# Patient Record
Sex: MALE | Race: WHITE | NOT HISPANIC OR LATINO | Employment: FULL TIME | URBAN - METROPOLITAN AREA
[De-identification: names, ages, dates, MRNs, and addresses within clinical notes are randomized per-mention and may not be internally consistent; named-entity substitution may affect disease eponyms.]

---

## 2023-03-03 PROBLEM — R09.81 SINUS CONGESTION: Status: ACTIVE | Noted: 2023-03-03

## 2023-03-03 PROBLEM — R05.9 COUGH: Status: ACTIVE | Noted: 2023-03-03

## 2023-03-03 PROBLEM — N46.9 MALE INFERTILITY: Status: ACTIVE | Noted: 2023-03-03

## 2023-03-03 PROBLEM — K11.8 PAROTID GLAND PAIN: Status: ACTIVE | Noted: 2023-03-03

## 2023-03-03 PROBLEM — B96.89 BACTERIAL URI: Status: ACTIVE | Noted: 2023-03-03

## 2023-03-03 PROBLEM — G93.40 ENCEPHALOPATHY: Status: ACTIVE | Noted: 2023-03-03

## 2023-03-03 PROBLEM — J30.9 ALLERGIC RHINITIS: Status: ACTIVE | Noted: 2023-03-03

## 2023-03-03 PROBLEM — R06.02 SHORTNESS OF BREATH: Status: ACTIVE | Noted: 2023-03-03

## 2023-03-03 PROBLEM — H04.123 DRY EYES, BILATERAL: Status: ACTIVE | Noted: 2023-03-03

## 2023-03-03 PROBLEM — M35.00 SJOGREN'S SYNDROME (MULTI): Status: ACTIVE | Noted: 2023-03-03

## 2023-03-03 PROBLEM — N52.9 ED (ERECTILE DYSFUNCTION): Status: ACTIVE | Noted: 2023-03-03

## 2023-03-03 PROBLEM — H57.89 EYE REDNESS: Status: ACTIVE | Noted: 2023-03-03

## 2023-03-03 PROBLEM — E28.39 ESTROGEN DEFICIENCY: Status: ACTIVE | Noted: 2023-03-03

## 2023-03-03 PROBLEM — H47.239 LARGE PHYSIOLOGIC CUPPING OF OPTIC DISC: Status: ACTIVE | Noted: 2023-03-03

## 2023-03-03 PROBLEM — E34.321: Status: ACTIVE | Noted: 2023-03-03

## 2023-03-03 PROBLEM — R53.83 FATIGUE: Status: ACTIVE | Noted: 2023-03-03

## 2023-03-03 PROBLEM — E66.3 OVERWEIGHT (BMI 25.0-29.9): Status: ACTIVE | Noted: 2023-03-03

## 2023-03-03 PROBLEM — F90.9 ADHD: Status: ACTIVE | Noted: 2023-03-03

## 2023-03-03 PROBLEM — R79.89 LOW TESTOSTERONE: Status: ACTIVE | Noted: 2023-03-03

## 2023-03-03 PROBLEM — R41.3 MEMORY LOSS: Status: ACTIVE | Noted: 2023-03-03

## 2023-03-03 PROBLEM — R00.1 BRADYCARDIA: Status: ACTIVE | Noted: 2023-03-03

## 2023-03-03 PROBLEM — R35.89 POLYURIA: Status: ACTIVE | Noted: 2023-03-03

## 2023-03-03 PROBLEM — M62.81 MUSCLE WEAKNESS: Status: ACTIVE | Noted: 2023-03-03

## 2023-03-03 PROBLEM — H11.003 PTERYGIUM OF BOTH EYES: Status: ACTIVE | Noted: 2023-03-03

## 2023-03-03 PROBLEM — H10.13 ALLERGIC CONJUNCTIVITIS OF BOTH EYES: Status: ACTIVE | Noted: 2023-03-03

## 2023-03-03 PROBLEM — J06.9 BACTERIAL URI: Status: ACTIVE | Noted: 2023-03-03

## 2023-03-03 PROBLEM — R60.0 SWELLING OF LEFT PAROTID GLAND: Status: ACTIVE | Noted: 2023-03-03

## 2023-03-03 PROBLEM — R35.0 INCREASED URINARY FREQUENCY: Status: ACTIVE | Noted: 2023-03-03

## 2023-03-03 PROBLEM — R76.8 ANA POSITIVE: Status: ACTIVE | Noted: 2023-03-03

## 2023-03-03 RX ORDER — CHORIONIC GONADOTROPIN 10000 UNIT
1500 KIT INTRAMUSCULAR
COMMUNITY
Start: 2022-04-08 | End: 2023-03-07

## 2023-03-03 RX ORDER — DEXTROAMPHETAMINE SACCHARATE, AMPHETAMINE ASPARTATE MONOHYDRATE, DEXTROAMPHETAMINE SULFATE AND AMPHETAMINE SULFATE 5; 5; 5; 5 MG/1; MG/1; MG/1; MG/1
1 CAPSULE, EXTENDED RELEASE ORAL DAILY
COMMUNITY
Start: 2022-07-02

## 2023-03-03 RX ORDER — SULFASALAZINE 500 MG/1
1 TABLET ORAL 2 TIMES DAILY
COMMUNITY
Start: 2022-05-17 | End: 2024-01-29 | Stop reason: SDUPTHER

## 2023-03-03 RX ORDER — CHORIONIC GONADOTROPIN, HUMAN 6000 UNIT
VIAL (EA) INJECTION
COMMUNITY
End: 2023-03-07

## 2023-03-03 RX ORDER — AZELASTINE 1 MG/ML
1 SPRAY, METERED NASAL 2 TIMES DAILY
COMMUNITY
Start: 2021-06-07 | End: 2024-04-30 | Stop reason: ALTCHOICE

## 2023-03-03 RX ORDER — TESTOSTERONE CYPIONATE 200 MG/ML
0.5 INJECTION, SOLUTION INTRAMUSCULAR
COMMUNITY
Start: 2022-08-15 | End: 2023-10-25 | Stop reason: SDUPTHER

## 2023-03-03 RX ORDER — FLUTICASONE PROPIONATE 50 MCG
1 SPRAY, SUSPENSION (ML) NASAL 2 TIMES DAILY
COMMUNITY
Start: 2022-06-15 | End: 2024-04-30 | Stop reason: ALTCHOICE

## 2023-03-07 ENCOUNTER — OFFICE VISIT (OUTPATIENT)
Dept: PRIMARY CARE | Facility: CLINIC | Age: 38
End: 2023-03-07
Payer: COMMERCIAL

## 2023-03-07 VITALS
DIASTOLIC BLOOD PRESSURE: 76 MMHG | HEIGHT: 72 IN | SYSTOLIC BLOOD PRESSURE: 118 MMHG | BODY MASS INDEX: 24.52 KG/M2 | WEIGHT: 181 LBS | HEART RATE: 65 BPM

## 2023-03-07 DIAGNOSIS — R20.0 BILATERAL HAND NUMBNESS: ICD-10-CM

## 2023-03-07 DIAGNOSIS — D35.1 PARATHYROID ADENOMA: Primary | ICD-10-CM

## 2023-03-07 PROCEDURE — 99214 OFFICE O/P EST MOD 30 MIN: CPT | Performed by: STUDENT IN AN ORGANIZED HEALTH CARE EDUCATION/TRAINING PROGRAM

## 2023-03-07 NOTE — PROGRESS NOTES
Subjective   Patient ID: Andrew Grant is a 37 y.o. male who presents for Follow-up (Pt presents today to review imaging ).  Today he is accompanied by alone.     HPI  Andrew is presenting after experiencing for a few years of brain fog, fatigue and muscle weakness.   He ordered an MRI of brain and neck as a concern of these chronic symptoms.     Current Outpatient Medications on File Prior to Visit   Medication Sig Dispense Refill    amphetamine-dextroamphetamine XR (Adderall XR) 20 mg 24 hr capsule Take 1 capsule (20 mg) by mouth once daily. For ADHD      azelastine (Astelin) 137 mcg (0.1 %) nasal spray Administer 1 spray into each nostril in the morning and 1 spray before bedtime.      fluticasone (Flonase) 50 mcg/actuation nasal spray Administer 1 spray into each nostril in the morning and 1 spray before bedtime.      sulfaSALAzine (Azulfidine) 500 mg tablet Take 1 tablet (500 mg) by mouth in the morning and 1 tablet (500 mg) before bedtime.      testosterone cypionate (Depo-Testosterone) 200 mg/mL injection Inject 0.5 mL (100 mg) into the shoulder, thigh, or buttocks 1 (one) time per week.      [DISCONTINUED] chorionic gonadotropin (Pregnyl) 10,000 unit injection Inject 1,500 Units into the shoulder, thigh, or buttocks 1 (one) time per week.      [DISCONTINUED] chorionic gonadotropin, human 6,000 unit recon soln HCG 6000 UNIT SOLR   Refills: 0       Active       No current facility-administered medications on file prior to visit.          Review of Systems  All pertinent positive symptoms are included in the history of present illness.  All other systems have been reviewed and are negative and noncontributory to this patient's current ailments.     Objective   /76 (BP Location: Left arm, Patient Position: Sitting, BP Cuff Size: Adult)   Pulse 65   Ht 1.829 m (6')   Wt 82.1 kg (181 lb)   BMI 24.55 kg/m²   BSA: 2.04 meters squared  No visits with results within 1 Month(s) from this visit.   Latest known  visit with results is:   [facility] Encounter on 11/23/2022   Component Date Value Ref Range Status    PSA 11/23/2022 0.66  0.00 - 4.00 ng/mL Final    Comment: The FDA requires that the method used for PSA assay be   reported to the physician. Values obtained with different   assay methods must not be used interchangeably. This test  was performed at [facility] using the Access   Hybritech PSA assay is a two-site immunoenzymatic sandwich   assay. The assay is approved for measurement of   prostate-specific antigen (PSA)in serum and may be used   in conjunction with a digital rectal examination in men   50 years and older as an aid in detection of prostate   cancer.  5-Alpha-reductase inhibitors (e.g. Proscar, Finasteride,   Avodart, Dutasteride and Luz Elena) for the treatment of BPH   have been shown to lower PSA levels by an average of 50%   after 6 months of treatment.      LUTROPIN (IU/ML) IN SER/PLAS 11/23/2022 <0.2  IU/L Final    Comment: REF VALUES  FOLLICULAR        PHASE     1.5-10.0  MID-CYCLE      13.0-72.0  LUTEAL PHASE    0.5-13.0  MENOPAUSE      15.0-65.0  PREPUBERTY        0- 3.0  CHILDREN          0- 6.0  ADULT MALE      1.0- 9.0      Testosterone, Total, LC-MS/MS 11/23/2022 737  250 - 1,100 ng/dL Final    Comment: For additional information, please refer to  http://education.[employer]/faq/  [email or web address]  (This link is being provided for informational/  educational purposes only.)     This test was developed and its analytical performance  characteristics have been determined by [facility] [place], [place]. It has  not been cleared or approved by the U.S. Food and Drug  Administration. This assay has been validated pursuant  to the CLIA regulations and is used for clinical  purposes.      Testosterone, Free 11/23/2022 180.6 (H)  35.0 - 155.0 pg/mL Final    Comment: This test was developed and its analytical performance  characteristics have  been determined by [employer] [place], [place]. It has  not been cleared or approved by the U.S. Food and Drug  Administration. This assay has been validated pursuant  to the CLIA regulations and is used for clinical  purposes.      Estradiol 11/23/2022 26  pg/mL Final    Comment: REF VALUES  FOLLICULAR PHASE      MID CYCLE             LUTEAL PHASE          POSTMENOPAUSE         < 32  PREPUBERTY            < 20  FEMALE 10-18Y        8-110  MALE   10-18Y         < 20  ADULT MALE            < 40      Hematocrit 11/23/2022 48.8  41.0 - 52.0 % Final       Physical Exam  Constitutional:       Appearance: Normal appearance. He is normal weight.   HENT:      Head: Normocephalic and atraumatic.      Right Ear: External ear normal.      Left Ear: External ear normal.      Nose: Congestion present.   Eyes:      Extraocular Movements: Extraocular movements intact.      Conjunctiva/sclera: Conjunctivae normal.   Cardiovascular:      Rate and Rhythm: Normal rate and regular rhythm.      Pulses: Normal pulses.      Heart sounds: Normal heart sounds.   Pulmonary:      Effort: Pulmonary effort is normal.      Breath sounds: Normal breath sounds.   Abdominal:      General: Abdomen is flat. Bowel sounds are normal.      Palpations: Abdomen is soft.   Musculoskeletal:         General: Normal range of motion.      Cervical back: Normal range of motion and neck supple.   Skin:     General: Skin is warm and dry.      Capillary Refill: Capillary refill takes less than 2 seconds.   Neurological:      General: No focal deficit present.      Mental Status: He is alert and oriented to person, place, and time.   Psychiatric:         Mood and Affect: Mood normal.         Behavior: Behavior normal.         Thought Content: Thought content normal.         Judgment: Judgment normal.       Assessment/Plan     Parathyoid adenoma:   Referral to Endocrinology given   NM parathyroid scan ordered per MRI  recommendations    2. Bilateral Hand numbness:   EMG order given      I have personally reviewed all available pertinent labs, imaging, and consult notes with the patient.     All questions and concerns were addressed. Patient verbalizes understanding instructions and agrees with established plan of care.     Patient seen and discussed with Dr. Meka Maldonado MD

## 2023-03-12 ENCOUNTER — TELEPHONE (OUTPATIENT)
Dept: PRIMARY CARE | Facility: CLINIC | Age: 38
End: 2023-03-12
Payer: COMMERCIAL

## 2023-03-12 NOTE — TELEPHONE ENCOUNTER
"Please call the patient about these results    This was in the old EMR and I am not sure if it is coming into this new EMR in the near future      EMG showed no abnormalities and stated that this was a normal evaluation    If anything, this could miss \"small fiber neuropathy \"    Neck step unfortunately would be to go follow-up with neurology to see if they would be able to provide any further information or guidance    Also, please follow-up with the other imaging that was recommended at his appointment  "

## 2023-03-15 DIAGNOSIS — R20.0 BILATERAL HAND NUMBNESS: ICD-10-CM

## 2023-03-17 DIAGNOSIS — R20.0 NUMBNESS AND TINGLING IN BOTH HANDS: ICD-10-CM

## 2023-03-17 DIAGNOSIS — D35.1 PARATHYROID ADENOMA: ICD-10-CM

## 2023-03-17 DIAGNOSIS — R20.2 NUMBNESS AND TINGLING IN BOTH HANDS: ICD-10-CM

## 2023-03-17 DIAGNOSIS — G62.9 PERIPHERAL POLYNEUROPATHY: ICD-10-CM

## 2023-03-17 NOTE — RESULT ENCOUNTER NOTE
This was already resulted through the old electronic medical record    It was a normal study to my knowledge, if this continues to be an issue, the next step would be looking towards neurology    The only thing that we could miss would be small fiber neuropathy but I am not fully sure

## 2023-03-18 LAB
ALANINE AMINOTRANSFERASE (SGPT) (U/L) IN SER/PLAS: 17 U/L (ref 10–52)
ALBUMIN (G/DL) IN SER/PLAS: 4.5 G/DL (ref 3.4–5)
ALKALINE PHOSPHATASE (U/L) IN SER/PLAS: 60 U/L (ref 33–120)
ANION GAP IN SER/PLAS: 12 MMOL/L (ref 10–20)
ASPARTATE AMINOTRANSFERASE (SGOT) (U/L) IN SER/PLAS: 15 U/L (ref 9–39)
BILIRUBIN TOTAL (MG/DL) IN SER/PLAS: 0.8 MG/DL (ref 0–1.2)
C REACTIVE PROTEIN (MG/L) IN SER/PLAS: 0.24 MG/DL
CALCIUM (MG/DL) IN SER/PLAS: 9.6 MG/DL (ref 8.6–10.6)
CARBON DIOXIDE, TOTAL (MMOL/L) IN SER/PLAS: 32 MMOL/L (ref 21–32)
CHLORIDE (MMOL/L) IN SER/PLAS: 101 MMOL/L (ref 98–107)
CREATININE (MG/DL) IN SER/PLAS: 0.81 MG/DL (ref 0.5–1.3)
ERYTHROCYTE DISTRIBUTION WIDTH (RATIO) BY AUTOMATED COUNT: 12.5 % (ref 11.5–14.5)
ERYTHROCYTE MEAN CORPUSCULAR HEMOGLOBIN CONCENTRATION (G/DL) BY AUTOMATED: 33.2 G/DL (ref 32–36)
ERYTHROCYTE MEAN CORPUSCULAR VOLUME (FL) BY AUTOMATED COUNT: 89 FL (ref 80–100)
ERYTHROCYTES (10*6/UL) IN BLOOD BY AUTOMATED COUNT: 5.41 X10E12/L (ref 4.5–5.9)
ESTRADIOL (PG/ML) IN SER/PLAS: 21 PG/ML
GFR MALE: >90 ML/MIN/1.73M2
GLUCOSE (MG/DL) IN SER/PLAS: 85 MG/DL (ref 74–99)
HEMATOCRIT (%) IN BLOOD BY AUTOMATED COUNT: 47.1 % (ref 41–52)
HEMATOCRIT (%) IN BLOOD BY AUTOMATED COUNT: 47.9 % (ref 41–52)
HEMOGLOBIN (G/DL) IN BLOOD: 15.9 G/DL (ref 13.5–17.5)
LEUKOCYTES (10*3/UL) IN BLOOD BY AUTOMATED COUNT: 6.2 X10E9/L (ref 4.4–11.3)
LUTEINIZING HORMONE (IU/ML) IN SER/PLAS: <0.1 IU/L
NRBC (PER 100 WBCS) BY AUTOMATED COUNT: 0 /100 WBC (ref 0–0)
PLATELETS (10*3/UL) IN BLOOD AUTOMATED COUNT: 192 X10E9/L (ref 150–450)
POTASSIUM (MMOL/L) IN SER/PLAS: 4 MMOL/L (ref 3.5–5.3)
PROTEIN TOTAL: 7.3 G/DL (ref 6.4–8.2)
SEDIMENTATION RATE, ERYTHROCYTE: 2 MM/H (ref 0–15)
SODIUM (MMOL/L) IN SER/PLAS: 141 MMOL/L (ref 136–145)
UREA NITROGEN (MG/DL) IN SER/PLAS: 13 MG/DL (ref 6–23)

## 2023-03-22 NOTE — RESULT ENCOUNTER NOTE
Imaging of parathyroid came back as within normal limits    There is no parathyroid adenomas    To be fully honest, I am not due for sure about his signs/symptoms other than him following up with neurology to discuss this further

## 2023-03-23 ENCOUNTER — TELEPHONE (OUTPATIENT)
Dept: PRIMARY CARE | Facility: CLINIC | Age: 38
End: 2023-03-23
Payer: COMMERCIAL

## 2023-03-23 NOTE — TELEPHONE ENCOUNTER
----- Message from Robert Ackerman DO sent at 3/22/2023  4:35 PM EDT -----  Imaging of parathyroid came back as within normal limits    There is no parathyroid adenomas    To be fully honest, I am not due for sure about his signs/symptoms other than him following up with neurology to discuss this further

## 2023-03-23 NOTE — TELEPHONE ENCOUNTER
Pt wants to know if there is any possible vitamin deficiency that could be associated with his symptoms? Otherwise he states he will follow up with neurology. I will place the referral.

## 2023-03-24 LAB
TESTOSTERONE FREE (CHAN): 114.2 PG/ML (ref 35–155)
TESTOSTERONE,TOTAL,LC-MS/MS: 680 NG/DL (ref 250–1100)

## 2023-08-24 PROBLEM — R41.89 BRAIN FOG: Status: ACTIVE | Noted: 2023-08-24

## 2023-08-24 PROBLEM — G62.9 SMALL FIBER NEUROPATHY: Status: ACTIVE | Noted: 2023-08-24

## 2023-08-24 RX ORDER — CHORIONIC GONADOTROPIN 10000 UNIT
KIT INTRAMUSCULAR
COMMUNITY
Start: 2022-04-08 | End: 2023-10-25 | Stop reason: ALTCHOICE

## 2023-08-24 RX ORDER — CHORIONIC GONADOTROPIN, HUMAN 6000 UNIT
VIAL (EA) INJECTION
COMMUNITY
End: 2023-10-25 | Stop reason: ALTCHOICE

## 2023-08-24 RX ORDER — SULFAMETHOXAZOLE AND TRIMETHOPRIM 800; 160 MG/1; MG/1
1 TABLET ORAL 2 TIMES DAILY
COMMUNITY
Start: 2022-12-06 | End: 2023-10-25 | Stop reason: ALTCHOICE

## 2023-08-24 RX ORDER — ACETAMINOPHEN 500 MG
1 TABLET ORAL
COMMUNITY

## 2023-08-24 RX ORDER — GLUCOSAM/CHONDRO/HERB 149/HYAL 750-100 MG
TABLET ORAL
COMMUNITY

## 2023-08-30 ENCOUNTER — OFFICE VISIT (OUTPATIENT)
Dept: PRIMARY CARE | Facility: CLINIC | Age: 38
End: 2023-08-30
Payer: COMMERCIAL

## 2023-08-30 VITALS
BODY MASS INDEX: 24.95 KG/M2 | HEART RATE: 81 BPM | HEIGHT: 72 IN | WEIGHT: 184.2 LBS | DIASTOLIC BLOOD PRESSURE: 72 MMHG | SYSTOLIC BLOOD PRESSURE: 126 MMHG

## 2023-08-30 DIAGNOSIS — Z00.00 HEALTHCARE MAINTENANCE: Primary | ICD-10-CM

## 2023-08-30 DIAGNOSIS — R79.89 LOW TESTOSTERONE: ICD-10-CM

## 2023-08-30 DIAGNOSIS — M35.00 SJOGREN'S SYNDROME, WITH UNSPECIFIED ORGAN INVOLVEMENT (MULTI): ICD-10-CM

## 2023-08-30 DIAGNOSIS — F90.9 ATTENTION DEFICIT HYPERACTIVITY DISORDER (ADHD), UNSPECIFIED ADHD TYPE: ICD-10-CM

## 2023-08-30 DIAGNOSIS — E53.8 B12 DEFICIENCY: ICD-10-CM

## 2023-08-30 DIAGNOSIS — R41.89 BRAIN FOG: ICD-10-CM

## 2023-08-30 DIAGNOSIS — E50.9 VITAMIN A DEFICIENCY: ICD-10-CM

## 2023-08-30 LAB
POC APPEARANCE, URINE: CLEAR
POC BILIRUBIN, URINE: NEGATIVE
POC BLOOD, URINE: NEGATIVE
POC COLOR, URINE: YELLOW
POC GLUCOSE, URINE: NEGATIVE MG/DL
POC KETONES, URINE: NEGATIVE MG/DL
POC LEUKOCYTES, URINE: NEGATIVE
POC NITRITE,URINE: NEGATIVE
POC PH, URINE: 6 PH
POC PROTEIN, URINE: NEGATIVE MG/DL
POC SPECIFIC GRAVITY, URINE: 1.02
POC UROBILINOGEN, URINE: 0.2 EU/DL

## 2023-08-30 PROCEDURE — 81002 URINALYSIS NONAUTO W/O SCOPE: CPT | Performed by: STUDENT IN AN ORGANIZED HEALTH CARE EDUCATION/TRAINING PROGRAM

## 2023-08-30 PROCEDURE — 93000 ELECTROCARDIOGRAM COMPLETE: CPT | Performed by: STUDENT IN AN ORGANIZED HEALTH CARE EDUCATION/TRAINING PROGRAM

## 2023-08-30 PROCEDURE — 99395 PREV VISIT EST AGE 18-39: CPT | Performed by: STUDENT IN AN ORGANIZED HEALTH CARE EDUCATION/TRAINING PROGRAM

## 2023-08-30 NOTE — PROGRESS NOTES
Subjective   Patient ID: Andrew Grant is a 38 y.o. male who presents for Annual Exam.  Today he is accompanied by alone.     HPI  Health maintenance  Overall patient is doing well.   Immunization: Tdap 1/2018  Influenza vaccine: Declines   COVID-19 vaccine (Pfizer Moderna) #1: 9/2021  #2: 9/2021  Colon Cancer Screening: No family history  Diet: eats healthy balanced diet  Exercise: minimal dedicated time, spends time playing with children  Tobacco: Denies use. Never smoker  EtOH: 5 units per week      2. Sjogren's Syndrome  Has been following rhuematology, Dr. Connolly  Following up in 1-2 months.  Is taking sulfasalazine 500 mg twice a day, OTC Biotene, and eyedrops.  Joint pain is decreased, but dry eye and mouth is not improving much.    3. ADHD  Has been taking Adderall XR 20 mg occasionally. Finds it helpful when needed.     4. Allergic rhinitis  Symptoms flare in the spring time.  Has been taking azelastine 137 mcg and Flonase 50 mcg nasal spray, once at morning and night.    5. Low testosterone  Receives 100 mg testosterone injection one time per week.  Finds it very helpful.    6. Erectile dysfunction  Following urology, Dr. Felix    7. Vitamin Deficiency  Patient states an online vitamin panel returned a low B12 and Vitamin A  He also states 'brain fog', muscle weakness, and tingling in the hands and feet for the past 3-4 years.     Current Outpatient Medications on File Prior to Visit   Medication Sig Dispense Refill    amphetamine-dextroamphetamine XR (Adderall XR) 20 mg 24 hr capsule Take 1 capsule (20 mg) by mouth once daily. For ADHD      azelastine (Astelin) 137 mcg (0.1 %) nasal spray Administer 1 spray into each nostril in the morning and 1 spray before bedtime.      cholecalciferol (Vitamin D-3) 5,000 Units tablet Take 1 tablet (5,000 Units) by mouth 1 (one) time per week.      chorionic gonadotropin (Pregnyl) 10,000 unit injection Inject into the shoulder, thigh, or buttocks.      chorionic  gonadotropin, human 6,000 unit recon soln HCG 6000 UNIT SOLR  Refills: 0      fluticasone (Flonase) 50 mcg/actuation nasal spray Administer 1 spray into each nostril in the morning and 1 spray before bedtime.      omega 3-dha-epa-fish oil (Fish OiL) 1,000 mg (120 mg-180 mg) capsule Take by mouth.      sulfamethoxazole-trimethoprim (Bactrim DS) 800-160 mg tablet Take 1 tablet by mouth 2 times a day.      sulfaSALAzine (Azulfidine) 500 mg tablet Take 1 tablet (500 mg) by mouth in the morning and 1 tablet (500 mg) before bedtime.      testosterone cypionate (Depo-Testosterone) 200 mg/mL injection Inject 0.5 mL (100 mg) into the shoulder, thigh, or buttocks 1 (one) time per week.       No current facility-administered medications on file prior to visit.        Allergies   Allergen Reactions    Amoxicillin-Pot Clavulanate Hives    Potassium Clavulanate Unknown       Immunization History   Administered Date(s) Administered    Hep A / Hep B 01/19/2018    Hep A, Unspecified 03/16/2018    Hep B, Unspecified 03/16/2018    Influenza, seasonal, injectable 11/12/2019    Pfizer Purple Cap SARS-CoV-2 09/08/2021, 09/29/2021    Td vaccine, age 7 years and older (TENIVAC) 01/19/2018         Review of Systems  All pertinent positive symptoms are included in the history of present illness.  All other systems have been reviewed and are negative and noncontributory to this patient's current ailments.     Objective   /72 (BP Location: Left arm, Patient Position: Sitting, BP Cuff Size: Adult)   Pulse 81   Ht 1.829 m (6')   Wt 83.6 kg (184 lb 3.2 oz)   BMI 24.98 kg/m²   BSA: 2.06 meters squared  Office Visit on 08/30/2023   Component Date Value Ref Range Status    POC Color, Urine 08/30/2023 Yellow  Straw, Yellow, Light Yellow Final    POC Appearance, Urine 08/30/2023 Clear  Clear Final    POC Specific Gravity, Urine 08/30/2023 1.020  1.005 - 1.035 Final    POC PH, Urine 08/30/2023 6.0  No Reference Range Established PH Final     POC Protein, Urine 08/30/2023 NEGATIVE  NEGATIVE, 30 (1+) mg/dl Final    POC Glucose, Urine 08/30/2023 NEGATIVE  NEGATIVE mg/dl Final    POC Blood, Urine 08/30/2023 NEGATIVE  NEGATIVE Final    POC Ketones, Urine 08/30/2023 NEGATIVE  NEGATIVE mg/dl Final    POC Bilirubin, Urine 08/30/2023 NEGATIVE  NEGATIVE Final    POC Urobilinogen, Urine 08/30/2023 0.2  0.2, 1.0 EU/DL Final    Poc Nitrate, Urine 08/30/2023 NEGATIVE  NEGATIVE Final    POC Leukocytes, Urine 08/30/2023 NEGATIVE  NEGATIVE Final       Physical Exam  CONSTITUTIONAL - well nourished, well developed, looks like stated age, in no acute distress, not ill-appearing, and not tired appearing  SKIN - normal skin color and pigmentation, normal skin turgor without rash, lesions, or nodules visualized  HEAD - no trauma, normocephalic  EYES - pupils are equal and reactive to light, extraocular muscles are intact, and normal external exam  ENT - TM's intact, no injection, no signs of infection, uvula midline, normal tongue movement and throat normal, no exudate, nasal passage without discharge and patent  NECK - supple without rigidity, no neck mass was observed, no thyromegaly or thyroid nodules  CHEST - clear to auscultation, no wheezing, no crackles and no rales, good effort  CARDIAC - regular rate and regular rhythm, no skipped beats, no murmur  ABDOMEN - no organomegaly, soft, nontender, nondistended, normal bowel sounds, no guarding/rebound/rigidity, negative McBurney sign and negative Luu sign  EXTREMITIES - no edema, no deformities  NEUROLOGICAL - DTRs equal and symmetrical; alert, oriented and no focal signs  PSYCHIATRIC - alert, pleasant and cordial, age-appropriate    Assessment/Plan   Health maintenance  Complete history and physical examination was performed  EKG reveals sinus rhythm with a short SD but otherwise no acute changes  We will notify of test results once available and make treatment recommendations accordingly  Please tell the  well-balanced diet and excise regularly    2. Sjogren's Syndrome  Continue following rhuematology, Dr. Connolly  Continue taking sulfasalazine 500 mg twice a day, OTC Biotene, and eyedrops    3. ADHD  Continue taking Adderall XR 20 mg once daily,  Otherwise continue following up with psychiatry    4. Allergic rhinitis  Continue taking azelastine 137 mcg and Flonase 50 mcg nasal spray, once at morning and night, as needed.    5. Low testosterone  Continue 100 mg testosterone injection one time per week    6. Erectile dysfunction  Continue following urology, Dr. Felix    7. Vitamin Deficiency  Will order Vitamin A and B12 labs alongside vitamin D  I would recommend you continue your supplementation daily and we can repeat this within the next 6 months

## 2023-08-31 ENCOUNTER — LAB (OUTPATIENT)
Dept: LAB | Facility: LAB | Age: 38
End: 2023-08-31
Payer: COMMERCIAL

## 2023-08-31 DIAGNOSIS — E53.8 B12 DEFICIENCY: ICD-10-CM

## 2023-08-31 DIAGNOSIS — E50.9 VITAMIN A DEFICIENCY: ICD-10-CM

## 2023-08-31 DIAGNOSIS — Z00.00 HEALTHCARE MAINTENANCE: ICD-10-CM

## 2023-08-31 DIAGNOSIS — R41.89 BRAIN FOG: ICD-10-CM

## 2023-08-31 LAB
ALANINE AMINOTRANSFERASE (SGPT) (U/L) IN SER/PLAS: 12 U/L (ref 10–52)
ALBUMIN (G/DL) IN SER/PLAS: 4.1 G/DL (ref 3.4–5)
ALKALINE PHOSPHATASE (U/L) IN SER/PLAS: 53 U/L (ref 33–120)
ANION GAP IN SER/PLAS: 9 MMOL/L (ref 10–20)
ASPARTATE AMINOTRANSFERASE (SGOT) (U/L) IN SER/PLAS: 16 U/L (ref 9–39)
BASOPHILS (10*3/UL) IN BLOOD BY AUTOMATED COUNT: 0.04 X10E9/L (ref 0–0.1)
BASOPHILS/100 LEUKOCYTES IN BLOOD BY AUTOMATED COUNT: 0.6 % (ref 0–2)
BILIRUBIN TOTAL (MG/DL) IN SER/PLAS: 0.5 MG/DL (ref 0–1.2)
CALCIDIOL (25 OH VITAMIN D3) (NG/ML) IN SER/PLAS: 56 NG/ML
CALCIUM (MG/DL) IN SER/PLAS: 9.2 MG/DL (ref 8.6–10.3)
CARBON DIOXIDE, TOTAL (MMOL/L) IN SER/PLAS: 29 MMOL/L (ref 21–32)
CHLORIDE (MMOL/L) IN SER/PLAS: 105 MMOL/L (ref 98–107)
CHOLESTEROL (MG/DL) IN SER/PLAS: 149 MG/DL (ref 0–199)
CHOLESTEROL IN HDL (MG/DL) IN SER/PLAS: 27.9 MG/DL
CHOLESTEROL/HDL RATIO: 5.3
COBALAMIN (VITAMIN B12) (PG/ML) IN SER/PLAS: 223 PG/ML (ref 211–911)
CREATININE (MG/DL) IN SER/PLAS: 0.87 MG/DL (ref 0.5–1.3)
EOSINOPHILS (10*3/UL) IN BLOOD BY AUTOMATED COUNT: 0.12 X10E9/L (ref 0–0.7)
EOSINOPHILS/100 LEUKOCYTES IN BLOOD BY AUTOMATED COUNT: 1.8 % (ref 0–6)
ERYTHROCYTE DISTRIBUTION WIDTH (RATIO) BY AUTOMATED COUNT: 13 % (ref 11.5–14.5)
ERYTHROCYTE MEAN CORPUSCULAR HEMOGLOBIN CONCENTRATION (G/DL) BY AUTOMATED: 32.6 G/DL (ref 32–36)
ERYTHROCYTE MEAN CORPUSCULAR VOLUME (FL) BY AUTOMATED COUNT: 90 FL (ref 80–100)
ERYTHROCYTES (10*6/UL) IN BLOOD BY AUTOMATED COUNT: 5.34 X10E12/L (ref 4.5–5.9)
GFR MALE: >90 ML/MIN/1.73M2
GLUCOSE (MG/DL) IN SER/PLAS: 76 MG/DL (ref 74–99)
HEMATOCRIT (%) IN BLOOD BY AUTOMATED COUNT: 47.9 % (ref 41–52)
HEMOGLOBIN (G/DL) IN BLOOD: 15.6 G/DL (ref 13.5–17.5)
IMMATURE GRANULOCYTES/100 LEUKOCYTES IN BLOOD BY AUTOMATED COUNT: 0.3 % (ref 0–0.9)
LDL: 109 MG/DL (ref 0–99)
LEUKOCYTES (10*3/UL) IN BLOOD BY AUTOMATED COUNT: 6.7 X10E9/L (ref 4.4–11.3)
LYMPHOCYTES (10*3/UL) IN BLOOD BY AUTOMATED COUNT: 2.18 X10E9/L (ref 1.2–4.8)
LYMPHOCYTES/100 LEUKOCYTES IN BLOOD BY AUTOMATED COUNT: 32.6 % (ref 13–44)
MONOCYTES (10*3/UL) IN BLOOD BY AUTOMATED COUNT: 0.74 X10E9/L (ref 0.1–1)
MONOCYTES/100 LEUKOCYTES IN BLOOD BY AUTOMATED COUNT: 11.1 % (ref 2–10)
NEUTROPHILS (10*3/UL) IN BLOOD BY AUTOMATED COUNT: 3.59 X10E9/L (ref 1.2–7.7)
NEUTROPHILS/100 LEUKOCYTES IN BLOOD BY AUTOMATED COUNT: 53.6 % (ref 40–80)
PLATELETS (10*3/UL) IN BLOOD AUTOMATED COUNT: 199 X10E9/L (ref 150–450)
POTASSIUM (MMOL/L) IN SER/PLAS: 4.2 MMOL/L (ref 3.5–5.3)
PROTEIN TOTAL: 7.3 G/DL (ref 6.4–8.2)
SODIUM (MMOL/L) IN SER/PLAS: 139 MMOL/L (ref 136–145)
THYROTROPIN (MIU/L) IN SER/PLAS BY DETECTION LIMIT <= 0.05 MIU/L: 1.98 MIU/L (ref 0.44–3.98)
TRIGLYCERIDE (MG/DL) IN SER/PLAS: 61 MG/DL (ref 0–149)
UREA NITROGEN (MG/DL) IN SER/PLAS: 16 MG/DL (ref 6–23)
VLDL: 12 MG/DL (ref 0–40)

## 2023-08-31 PROCEDURE — 80053 COMPREHEN METABOLIC PANEL: CPT

## 2023-08-31 PROCEDURE — 82306 VITAMIN D 25 HYDROXY: CPT

## 2023-08-31 PROCEDURE — 84443 ASSAY THYROID STIM HORMONE: CPT

## 2023-08-31 PROCEDURE — 36415 COLL VENOUS BLD VENIPUNCTURE: CPT

## 2023-08-31 PROCEDURE — 83036 HEMOGLOBIN GLYCOSYLATED A1C: CPT

## 2023-08-31 PROCEDURE — 80061 LIPID PANEL: CPT

## 2023-08-31 PROCEDURE — 82607 VITAMIN B-12: CPT

## 2023-08-31 PROCEDURE — 85025 COMPLETE CBC W/AUTO DIFF WBC: CPT

## 2023-08-31 PROCEDURE — 84590 ASSAY OF VITAMIN A: CPT

## 2023-09-01 ENCOUNTER — TELEPHONE (OUTPATIENT)
Dept: PRIMARY CARE | Facility: CLINIC | Age: 38
End: 2023-09-01
Payer: COMMERCIAL

## 2023-09-01 LAB
ESTIMATED AVERAGE GLUCOSE FOR HBA1C: 105 MG/DL
HEMOGLOBIN A1C/HEMOGLOBIN TOTAL IN BLOOD: 5.3 %

## 2023-09-01 NOTE — TELEPHONE ENCOUNTER
Spoke w/pt, pt aware of results    Cholesterol looks good at 149, HDL 27, LDL slightly elevated at 109 but triglycerides at 61     Sugar, kidneys, liver, electrolytes are all within normal limits     Vitamin B12 within normal limits at but at the lower end of normal     Vitamin D fantastic at 56     Thyroid within normal limits alongside complete blood cell count     We are still waiting for the vitamin A as well as a hemoglobin A1c at this time

## 2023-09-01 NOTE — RESULT ENCOUNTER NOTE
Cholesterol looks good at 149, HDL 27, LDL slightly elevated at 109 but triglycerides at 61    Sugar, kidneys, liver, electrolytes are all within normal limits    Vitamin B12 within normal limits at but at the lower end of normal    Vitamin D fantastic at 56    Thyroid within normal limits alongside complete blood cell count    We are still waiting for the vitamin A as well as a hemoglobin A1c at this time

## 2023-09-04 LAB
VITAMIN A (RETINOL): 0.56 MG/L (ref 0.3–1.2)
VITAMIN A (RETINYL PALMITATE): <0.02 MG/L (ref 0–0.1)
VITAMIN A, INTERPRETATION: NORMAL

## 2023-10-03 ENCOUNTER — APPOINTMENT (OUTPATIENT)
Dept: UROLOGY | Facility: CLINIC | Age: 38
End: 2023-10-03
Payer: COMMERCIAL

## 2023-10-19 ENCOUNTER — LAB (OUTPATIENT)
Dept: LAB | Facility: LAB | Age: 38
End: 2023-10-19
Payer: COMMERCIAL

## 2023-10-19 DIAGNOSIS — M35.00 SJOGREN SYNDROME, UNSPECIFIED (MULTI): ICD-10-CM

## 2023-10-19 DIAGNOSIS — R79.89 OTHER SPECIFIED ABNORMAL FINDINGS OF BLOOD CHEMISTRY: Primary | ICD-10-CM

## 2023-10-19 LAB
CRP SERPL-MCNC: <0.1 MG/DL
ERYTHROCYTE [SEDIMENTATION RATE] IN BLOOD BY WESTERGREN METHOD: 12 MM/H (ref 0–15)
HCT VFR BLD AUTO: 47.9 % (ref 41–52)
PSA SERPL-MCNC: 0.63 NG/ML

## 2023-10-19 PROCEDURE — 86140 C-REACTIVE PROTEIN: CPT

## 2023-10-19 PROCEDURE — 83002 ASSAY OF GONADOTROPIN (LH): CPT

## 2023-10-19 PROCEDURE — 86225 DNA ANTIBODY NATIVE: CPT

## 2023-10-19 PROCEDURE — 82670 ASSAY OF TOTAL ESTRADIOL: CPT

## 2023-10-19 PROCEDURE — 36415 COLL VENOUS BLD VENIPUNCTURE: CPT

## 2023-10-19 PROCEDURE — 85652 RBC SED RATE AUTOMATED: CPT

## 2023-10-19 PROCEDURE — 86160 COMPLEMENT ANTIGEN: CPT

## 2023-10-19 PROCEDURE — 85014 HEMATOCRIT: CPT

## 2023-10-19 PROCEDURE — 84153 ASSAY OF PSA TOTAL: CPT

## 2023-10-19 PROCEDURE — 84403 ASSAY OF TOTAL TESTOSTERONE: CPT

## 2023-10-20 LAB
C3 SERPL-MCNC: 106 MG/DL (ref 87–200)
C4 SERPL-MCNC: 23 MG/DL (ref 10–50)
DSDNA AB SER-ACNC: <1 IU/ML
ESTRADIOL SERPL-MCNC: <19 PG/ML
LH SERPL-ACNC: 0.1 IU/L
TESTOST SERPL-MCNC: 625 NG/DL (ref 240–1000)

## 2023-10-25 ENCOUNTER — OFFICE VISIT (OUTPATIENT)
Dept: UROLOGY | Facility: CLINIC | Age: 38
End: 2023-10-25
Payer: COMMERCIAL

## 2023-10-25 DIAGNOSIS — E29.1 HYPOGONADISM IN MALE: Primary | ICD-10-CM

## 2023-10-25 DIAGNOSIS — E28.39 ESTROGEN DEFICIENCY: ICD-10-CM

## 2023-10-25 DIAGNOSIS — Z12.5 PROSTATE CANCER SCREENING: ICD-10-CM

## 2023-10-25 DIAGNOSIS — N52.9 ERECTILE DYSFUNCTION, UNSPECIFIED ERECTILE DYSFUNCTION TYPE: ICD-10-CM

## 2023-10-25 PROCEDURE — 99214 OFFICE O/P EST MOD 30 MIN: CPT | Performed by: UROLOGY

## 2023-10-25 RX ORDER — TESTOSTERONE CYPIONATE 200 MG/ML
100 INJECTION, SOLUTION INTRAMUSCULAR
Qty: 4 ML | Refills: 5 | Status: SHIPPED | OUTPATIENT
Start: 2023-10-25 | End: 2024-01-02 | Stop reason: SDUPTHER

## 2023-10-25 NOTE — PROGRESS NOTES
"HPI  39 yo male patient here for concern for hypogonadism and vasectomy     Last visit 3/29/23:  -TC .5 cc weekly  -sp vasectomy SA  -low t fu labs     Today's visit 10/25/23  #Vasectomy  -sp vasectomy 11/29/22: surg path vas deferens       #Hypogonadism  -good energy. Libido comes and goes  -denies breast enlargement, sensitivity, or pain  -has issues with hands and feet, not correlated with T therapy, being evaluated  -libido comes and goes  -He reports he is still experiencing brain fog, muscle weakness, and tingling in feet for 3-4 years has been seen by his rheumatologist.        HISTORY:  -[facility] manages testosterone for 1.5 years- cypionate 160 mg weekly   -he has provided blood work from 2019  -Previous gynecomastia: yes 1 year ago   -Personal or family history of prostate cancer: none  -Previous use of testosterone or anabolic steroids: currently on testosterone cypionate   Previous labs prior to starting T reviewed (brought in results from [facility], scanned in): T 400; free T 7.6, LH 4.7  E2 10        #Erectile Dysfunction  -not interested in meds - improved      Lab Results   Component Value Date    TESTOSTERONE 625 10/19/2023     Lab Results   Component Value Date    LH 0.1 10/19/2023     Lab Results   Component Value Date    ESTRADIOL <19 10/19/2023     Lab Results   Component Value Date    PSA 0.63 10/19/2023     Lab Results   Component Value Date    GFRMALE >90 08/31/2023     Lab Results   Component Value Date    CREATININE 0.87 08/31/2023     Lab Results   Component Value Date    TESTF 114.2 03/18/2023     No results found for: \"HEAD4\"  Lab Results   Component Value Date    HCT 47.9 10/19/2023       Current Medications:  Current Outpatient Medications   Medication Sig Dispense Refill    amphetamine-dextroamphetamine XR (Adderall XR) 20 mg 24 hr capsule Take 1 capsule (20 mg) by mouth once daily. For ADHD      azelastine (Astelin) 137 mcg (0.1 %) nasal spray Administer 1 spray into each " nostril 2 times a day.      cholecalciferol (Vitamin D-3) 5,000 Units tablet Take 1 tablet (5,000 Units) by mouth 1 (one) time per week.      fluticasone (Flonase) 50 mcg/actuation nasal spray Administer 1 spray into each nostril 2 times a day.      omega 3-dha-epa-fish oil (Fish OiL) 1,000 mg (120 mg-180 mg) capsule Take by mouth.      sulfaSALAzine (Azulfidine) 500 mg tablet Take 1 tablet (500 mg) by mouth 2 times a day.      testosterone cypionate (Depo-Testosterone) 200 mg/mL injection Inject 0.5 mL (100 mg) into the muscle 1 (one) time per week.      chorionic gonadotropin (Pregnyl) 10,000 unit injection Inject into the shoulder, thigh, or buttocks.      chorionic gonadotropin, human 6,000 unit recon soln HCG 6000 UNIT SOLR  Refills: 0      sulfamethoxazole-trimethoprim (Bactrim DS) 800-160 mg tablet Take 1 tablet by mouth 2 times a day.       No current facility-administered medications for this visit.        PMH:  Past Medical History:   Diagnosis Date    Allergy status to unspecified drugs, medicaments and biological substances     History of seasonal allergies    Personal history of other specified conditions 11/09/2022    History of fatigue       PSH:  Past Surgical History:   Procedure Laterality Date    OTHER SURGICAL HISTORY  07/02/2020    Jaw surgery    OTHER SURGICAL HISTORY  07/02/2020    Hand surgery    OTHER SURGICAL HISTORY  07/02/2020    Hernia repair       FMH:  No family history on file.    SHx:  Social History     Tobacco Use    Smoking status: Never    Smokeless tobacco: Never       Allergies:  Allergies   Allergen Reactions    Amoxicillin-Pot Clavulanate Hives    Potassium Clavulanate Unknown       Physical Exam     CONSTITUTIONAL:        No acute distress      HEAD:        Normocephalic and atraumatic      CHEST / RESPIRATORY      no excess work of breathing, no respiratory distress,      ABDOMEN / GASTROINTESTINAL:        Abdomen nondistended      GENITOURINARY / GENITALIA:    Testicles  descended bilaterally, testicular size 25cc bilaterally, soft, nontender to palpation    Varicocele: none palpable    Hydrocele: none palpable    Vas deferens and epididymis palpable bilaterally    Penis: normal  circumcised phallus without lesions, meatus in normal anatomic location      Assesment/Plan  #Hypogonadism  -continue TC .5cc weekly, refilled     #Screening for polycythemia  -Hct  -prn phlebotomy for Hct>54     #Screening for hyperestrogenemia  -E2   -arimidex if E is elevated     #Prostate cancer screening  -PSA     #sp vasectomy  -post vas SA looks good     fu in 1 year with low T FU labs       Scribe Attestation  By signing my name below, I, Shamika Mercado-Izzy Warren   attest that this documentation has been prepared under the direction and in the presence of Indio Felix MD.

## 2024-01-02 DIAGNOSIS — E29.1 HYPOGONADISM IN MALE: ICD-10-CM

## 2024-01-02 RX ORDER — TESTOSTERONE CYPIONATE 200 MG/ML
100 INJECTION, SOLUTION INTRAMUSCULAR
Qty: 4 ML | Refills: 5 | Status: SHIPPED | OUTPATIENT
Start: 2024-01-02 | End: 2024-02-23 | Stop reason: SDUPTHER

## 2024-01-26 ENCOUNTER — TELEMEDICINE (OUTPATIENT)
Dept: UROLOGY | Facility: CLINIC | Age: 39
End: 2024-01-26
Payer: COMMERCIAL

## 2024-01-26 DIAGNOSIS — E29.1 HYPOGONADISM IN MALE: ICD-10-CM

## 2024-01-26 DIAGNOSIS — Z09 ENCOUNTER FOR FOLLOW-UP: ICD-10-CM

## 2024-01-26 DIAGNOSIS — Z00.00 HEALTH MAINTENANCE EXAMINATION: ICD-10-CM

## 2024-01-26 DIAGNOSIS — N52.9 ERECTILE DYSFUNCTION, UNSPECIFIED ERECTILE DYSFUNCTION TYPE: Primary | ICD-10-CM

## 2024-01-26 PROCEDURE — 99214 OFFICE O/P EST MOD 30 MIN: CPT | Performed by: UROLOGY

## 2024-01-26 RX ORDER — TADALAFIL 20 MG/1
20 TABLET ORAL AS NEEDED
Qty: 30 TABLET | Refills: 6 | Status: SHIPPED | OUTPATIENT
Start: 2024-01-26 | End: 2025-01-25

## 2024-01-26 NOTE — PROGRESS NOTES
Virtual or Telephone Consent    An interactive audio and video telecommunication system which permits real time communications between the patient (at the originating site) and provider (at the distant site) was utilized to provide this telehealth service.   Verbal consent was requested and obtained from Andrew Grant on this date, 01/26/24 for a telehealth visit.     HPI  39 yo male patient here for concern for hypogonadism and vasectomy     Last visit 10/25/23:  -continue TC .5cc weekly, refilled, no changes throughout week  -PSA  -post vas SA looks good         Today's visit:  #Erectile Dysfunction  -New erectile dysfunction     Duration: Last few months  On nitrates/NTG?: No  Smoker? No  Partner: wife  Tried PDE5is?: No  Tried penile injections: no  Libido/Desire: Low    #Vasectomy  -sp vasectomy 11/29/22: surg path vas deferens     #Hypogonadism  -fertility labs not completed  -TC .5cc -->  -Not taking estrogen blockers  -No breast enlargement, tenderness or pain side effects.    -He reports he is still experiencing brain fog, muscle weakness, and tingling in feet for 3-4 years has been seen by his rheumatologist.     HISTORY:  -[employer] manages testosterone for 1.5 years- cypionate 160 mg weekly   -he has provided blood work from 2019  -Previous gynecomastia: yes 1 year ago   -Personal or family history of prostate cancer: none  -Previous use of testosterone or anabolic steroids: currently on testosterone cypionate   Previous labs prior to starting T reviewed (brought in results from [facility], scanned in): T 400; free T 7.6, LH 4.7  E2 10      Current Medications:  Current Outpatient Medications   Medication Sig Dispense Refill    amphetamine-dextroamphetamine XR (Adderall XR) 20 mg 24 hr capsule Take 1 capsule (20 mg) by mouth once daily. For ADHD      azelastine (Astelin) 137 mcg (0.1 %) nasal spray Administer 1 spray into each nostril 2 times a day.      cholecalciferol (Vitamin D-3) 5,000 Units  tablet Take 1 tablet (5,000 Units) by mouth 1 (one) time per week.      fluticasone (Flonase) 50 mcg/actuation nasal spray Administer 1 spray into each nostril 2 times a day.      omega 3-dha-epa-fish oil (Fish OiL) 1,000 mg (120 mg-180 mg) capsule Take by mouth.      sulfaSALAzine (Azulfidine) 500 mg tablet Take 1 tablet (500 mg) by mouth 2 times a day.      testosterone cypionate (Depo-Testosterone) 200 mg/mL injection Inject 0.5 mL (100 mg) into the muscle 1 (one) time per week. 4 mL 5     No current facility-administered medications for this visit.        PMH:  Past Medical History:   Diagnosis Date    Allergy status to unspecified drugs, medicaments and biological substances     History of seasonal allergies    Personal history of other specified conditions 11/09/2022    History of fatigue       PSH:  Past Surgical History:   Procedure Laterality Date    OTHER SURGICAL HISTORY  07/02/2020    Jaw surgery    OTHER SURGICAL HISTORY  07/02/2020    Hand surgery    OTHER SURGICAL HISTORY  07/02/2020    Hernia repair       FMH:  No family history on file.    SHx:  Social History     Tobacco Use    Smoking status: Never    Smokeless tobacco: Never       Allergies:  Allergies   Allergen Reactions    Amoxicillin-Pot Clavulanate Hives    Potassium Clavulanate Unknown       Physical Exam   CONSTITUTIONAL:        No acute distress    HEAD:        Normocephalic and atraumatic    CHEST / RESPIRATORY      no excess work of breathing, no respiratory distress,    ABDOMEN / GASTROINTESTINAL:        Abdomen nondistended        Assesment/Plan  #Hypogonadism  -continue TC .5cc weekly  -low T fu labs now     #Screening for polycythemia  -Hct  -prn phlebotomy for Hct>54     #Screening for hyperestrogenemia  -E2   -arimidex if E is elevated     #Prostate cancer screening  -PSA     #sp vasectomy  -post vas SA looks good    #Erectile Dysfunction: I discussed the etiology and different treatment modalities for erectile dysfunction.  Specifically, we talked about lifestyle factors like smoking, diet, and exercise. We also discussed ED as a sign of systemic disease, and the contributions of elevated cholesterol and elevated blood sugars on erections. We then discussed treatment modalities, specifically PDE5 inhibitors, intracavernosal injections, vacuum erectile devices, and penile prostheses.    ED panel  Trial of Cialis 20mg - medication counseling done. Can start with half tab. Discussed side effects including priapism, headaches, stuffiness, and back pain. Discussed that if he gets an erection >4 hours, he needs to present to the emergency room or risk worsening of his erectile dysfunction long term.   Referral to Dr. Man, sex therapist/counselor      Follow up in 1 month    Scribe Attestation  By signing my name below, Shamika SUGGS Scribe, attest that this documentation  has been prepared under the direction and in the presence of Indio Felix MD.     Scribe Attestation  By signing my name below, Ольга SUGGS Scribe attest that this documentation has been prepared under the direction and in the presence of Indio Felix MD.

## 2024-01-29 ENCOUNTER — OFFICE VISIT (OUTPATIENT)
Dept: ORTHOPEDIC SURGERY | Facility: CLINIC | Age: 39
End: 2024-01-29
Payer: COMMERCIAL

## 2024-01-29 ENCOUNTER — HOSPITAL ENCOUNTER (OUTPATIENT)
Dept: RADIOLOGY | Facility: CLINIC | Age: 39
Discharge: HOME | End: 2024-01-29
Payer: COMMERCIAL

## 2024-01-29 VITALS — WEIGHT: 187 LBS | HEIGHT: 72 IN | BODY MASS INDEX: 25.33 KG/M2

## 2024-01-29 DIAGNOSIS — M25.551 RIGHT HIP PAIN: ICD-10-CM

## 2024-01-29 DIAGNOSIS — M25.551 RIGHT HIP PAIN: Primary | ICD-10-CM

## 2024-01-29 DIAGNOSIS — Q87.19 SJOGREN-LARSSON SYNDROME (HHS-HCC): Primary | ICD-10-CM

## 2024-01-29 PROCEDURE — 73502 X-RAY EXAM HIP UNI 2-3 VIEWS: CPT | Mod: RIGHT SIDE | Performed by: RADIOLOGY

## 2024-01-29 PROCEDURE — 99204 OFFICE O/P NEW MOD 45 MIN: CPT | Performed by: ORTHOPAEDIC SURGERY

## 2024-01-29 PROCEDURE — 73502 X-RAY EXAM HIP UNI 2-3 VIEWS: CPT | Mod: RT

## 2024-01-29 PROCEDURE — 1036F TOBACCO NON-USER: CPT | Performed by: ORTHOPAEDIC SURGERY

## 2024-01-29 RX ORDER — SULFASALAZINE 500 MG/1
500 TABLET ORAL 2 TIMES DAILY
Qty: 60 TABLET | Refills: 1 | Status: SHIPPED | OUTPATIENT
Start: 2024-01-29 | End: 2024-02-28

## 2024-01-29 RX ORDER — MELOXICAM 15 MG/1
15 TABLET ORAL DAILY
Qty: 30 TABLET | Refills: 0 | Status: SHIPPED | OUTPATIENT
Start: 2024-01-29 | End: 2024-02-28

## 2024-01-29 ASSESSMENT — PAIN - FUNCTIONAL ASSESSMENT: PAIN_FUNCTIONAL_ASSESSMENT: 0-10

## 2024-01-29 ASSESSMENT — PAIN SCALES - GENERAL: PAINLEVEL_OUTOF10: 5 - MODERATE PAIN

## 2024-01-29 ASSESSMENT — PAIN DESCRIPTION - DESCRIPTORS: DESCRIPTORS: ACHING;SHARP

## 2024-01-29 NOTE — PROGRESS NOTES
Chief complaint is a couple months of right groin pain.  No particular injury hurts to get out of bed or any walks for a while relieved by rest  History of hernia surgery on that side the past no known history of childhood hip problems  No treatment so far  Works at a desk job    Past medical,family and social histories have been reviewed and are up to date.  All other body systems have been reviewed and are negative for complaint.  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

## 2024-01-30 ENCOUNTER — APPOINTMENT (OUTPATIENT)
Dept: RADIOLOGY | Facility: CLINIC | Age: 39
End: 2024-01-30
Payer: COMMERCIAL

## 2024-02-19 ENCOUNTER — LAB (OUTPATIENT)
Dept: LAB | Facility: LAB | Age: 39
End: 2024-02-19
Payer: COMMERCIAL

## 2024-02-19 DIAGNOSIS — N52.9 ERECTILE DYSFUNCTION, UNSPECIFIED ERECTILE DYSFUNCTION TYPE: ICD-10-CM

## 2024-02-19 DIAGNOSIS — E29.1 HYPOGONADISM IN MALE: ICD-10-CM

## 2024-02-19 DIAGNOSIS — Z12.5 PROSTATE CANCER SCREENING: ICD-10-CM

## 2024-02-19 DIAGNOSIS — Z00.00 HEALTH MAINTENANCE EXAMINATION: ICD-10-CM

## 2024-02-19 LAB
CREAT SERPL-MCNC: 0.93 MG/DL (ref 0.5–1.3)
EGFRCR SERPLBLD CKD-EPI 2021: >90 ML/MIN/1.73M*2
ESTRADIOL SERPL-MCNC: 140 PG/ML
FSH SERPL-ACNC: <0.9 IU/L
HCT VFR BLD AUTO: 50 % (ref 41–52)
LH SERPL-ACNC: <0.1 IU/L
PROLACTIN SERPL-MCNC: 9.7 UG/L (ref 2–18)
PSA SERPL-MCNC: <0.1 NG/ML
TESTOST SERPL-MCNC: 772 NG/DL (ref 240–1000)

## 2024-02-19 PROCEDURE — 84403 ASSAY OF TOTAL TESTOSTERONE: CPT

## 2024-02-19 PROCEDURE — 84153 ASSAY OF PSA TOTAL: CPT

## 2024-02-19 PROCEDURE — 82565 ASSAY OF CREATININE: CPT

## 2024-02-19 PROCEDURE — 83002 ASSAY OF GONADOTROPIN (LH): CPT

## 2024-02-19 PROCEDURE — 83001 ASSAY OF GONADOTROPIN (FSH): CPT

## 2024-02-19 PROCEDURE — 85014 HEMATOCRIT: CPT

## 2024-02-19 PROCEDURE — 36415 COLL VENOUS BLD VENIPUNCTURE: CPT

## 2024-02-19 PROCEDURE — 84146 ASSAY OF PROLACTIN: CPT

## 2024-02-19 PROCEDURE — 82670 ASSAY OF TOTAL ESTRADIOL: CPT

## 2024-02-23 ENCOUNTER — TELEMEDICINE (OUTPATIENT)
Dept: UROLOGY | Facility: CLINIC | Age: 39
End: 2024-02-23
Payer: COMMERCIAL

## 2024-02-23 DIAGNOSIS — E29.1 HYPOGONADISM MALE: ICD-10-CM

## 2024-02-23 DIAGNOSIS — Z98.52 VASECTOMY STATUS: ICD-10-CM

## 2024-02-23 DIAGNOSIS — E28.0 ESTROGEN EXCESS: ICD-10-CM

## 2024-02-23 DIAGNOSIS — E29.1 HYPOGONADISM IN MALE: ICD-10-CM

## 2024-02-23 DIAGNOSIS — N52.9 ERECTILE DYSFUNCTION, UNSPECIFIED ERECTILE DYSFUNCTION TYPE: Primary | ICD-10-CM

## 2024-02-23 PROCEDURE — 99214 OFFICE O/P EST MOD 30 MIN: CPT | Performed by: UROLOGY

## 2024-02-23 PROCEDURE — 1036F TOBACCO NON-USER: CPT | Performed by: UROLOGY

## 2024-02-23 RX ORDER — SYRINGE W-NEEDLE,DISPOSAB,3 ML 23GX1"
SYRINGE, EMPTY DISPOSABLE MISCELLANEOUS
Qty: 16 EACH | Refills: 3 | Status: SHIPPED | OUTPATIENT
Start: 2024-02-23 | End: 2024-04-30 | Stop reason: ALTCHOICE

## 2024-02-23 RX ORDER — TESTOSTERONE CYPIONATE 200 MG/ML
100 INJECTION, SOLUTION INTRAMUSCULAR
Qty: 4 ML | Refills: 5 | Status: SHIPPED | OUTPATIENT
Start: 2024-02-23

## 2024-02-23 RX ORDER — ANASTROZOLE 1 MG/1
0.5 TABLET ORAL
Qty: 4 TABLET | Refills: 5 | Status: SHIPPED | OUTPATIENT
Start: 2024-02-23 | End: 2024-03-24

## 2024-02-23 NOTE — PROGRESS NOTES
"Virtual or Telephone Consent    An interactive audio and video telecommunication system which permits real time communications between the patient (at the originating site) and provider (at the distant site) was utilized to provide this telehealth service.   Patient provided consent    HPI  37 yo male patient here for concern for hypogonadism and vasectomy and erectile dysfunction    Last visit  #Hypogonadism  -continue TC .5cc weekly  -low T fu labs now    #sp vasectomy  -post vas SA looks good     #Erectile Dysfunction   -ED panel  -Trial of Cialis 20mg - medication counseling done. Can start with half tab. Discussed side effects including priapism, headaches, stuffiness, and back pain. Discussed that if he gets an erection >4 hours, he needs to present to the emergency room or risk worsening of his erectile dysfunction long term.   -Referral to Dr. Man, sex therapist/counselor    Today's visit:  #Erectile Dysfunction  -Taking cialis  -Happy with dose, no side effects  -No prolonged erections    #Hypogonadism  -Continue testosterone  -    #Elevated estrogen  -Labs showed elevated levels     #Vasectomy  -Doing well    Previous Hx  #Erectile Dysfunction  -New erectile dysfunction      Duration: Last few months  On nitrates/NTG?: No  Smoker? No  Partner: wife  Tried PDE5is?: No  Tried penile injections: no  Libido/Desire: Low     #Vasectomy  -sp vasectomy 11/29/22: surg path vas deferens     #Hypogonadism  -fertility labs not completed  -TC .5cc -->  -Not taking estrogen blockers  -No breast enlargement, tenderness or pain side effects.    Labs  Lab Results   Component Value Date    TESTOSTERONE 772 02/19/2024    TESTOSTERONE 625 10/19/2023     Lab Results   Component Value Date    LH <0.1 02/19/2024     Lab Results   Component Value Date    FSH <0.9 02/19/2024     No components found for: \"ESTRADIAL\"  Lab Results   Component Value Date    PSA <0.10 02/19/2024     No components found for: \"CBC\"  Lab Results "   Component Value Date    PROLACTIN 9.7 02/19/2024     Lab Results   Component Value Date    HGBA1C 5.3 08/31/2023     Lab Results   Component Value Date    HCT 50.0 02/19/2024           PMH:  Past Medical History:   Diagnosis Date    Allergy status to unspecified drugs, medicaments and biological substances     History of seasonal allergies    Personal history of other specified conditions 11/09/2022    History of fatigue        PSH:  Past Surgical History:   Procedure Laterality Date    OTHER SURGICAL HISTORY  07/02/2020    Jaw surgery    OTHER SURGICAL HISTORY  07/02/2020    Hand surgery    OTHER SURGICAL HISTORY  07/02/2020    Hernia repair        Medications:    Current Outpatient Medications:     amphetamine-dextroamphetamine XR (Adderall XR) 20 mg 24 hr capsule, Take 1 capsule (20 mg) by mouth once daily. For ADHD, Disp: , Rfl:     azelastine (Astelin) 137 mcg (0.1 %) nasal spray, Administer 1 spray into each nostril 2 times a day., Disp: , Rfl:     cholecalciferol (Vitamin D-3) 5,000 Units tablet, Take 1 tablet (5,000 Units) by mouth 1 (one) time per week., Disp: , Rfl:     fluticasone (Flonase) 50 mcg/actuation nasal spray, Administer 1 spray into each nostril 2 times a day., Disp: , Rfl:     meloxicam (Mobic) 15 mg tablet, Take 1 tablet (15 mg) by mouth once daily., Disp: 30 tablet, Rfl: 0    omega 3-dha-epa-fish oil (Fish OiL) 1,000 mg (120 mg-180 mg) capsule, Take by mouth., Disp: , Rfl:     sulfaSALAzine (Azulfidine) 500 mg tablet, Take 1 tablet (500 mg) by mouth 2 times a day., Disp: 60 tablet, Rfl: 1    tadalafil (Cialis) 20 mg tablet, Take 1 tablet (20 mg) by mouth if needed for erectile dysfunction (Start with half tab. Take 2 hours prior to wanting erection.If you get an erection over 4 hours, go to the emergency room.)., Disp: 30 tablet, Rfl: 6    testosterone cypionate (Depo-Testosterone) 200 mg/mL injection, Inject 0.5 mL (100 mg) into the muscle 1 (one) time per week., Disp: 4 mL, Rfl:  5    Allergy:  Allergies   Allergen Reactions    Amoxicillin-Pot Clavulanate Hives    Potassium Clavulanate Unknown        Exam  CONSTITUTIONAL:        No acute distress    HEAD:        Normocephalic and atraumatic    CHEST / RESPIRATORY      no excess work of breathing, no respiratory distress,    ABDOMEN / GASTROINTESTINAL:        Abdomen nondistended          Assessment/Plan  #Erectile Dysfunction  -Continue cialis 10mg prn    #Elevated estrogen  -Start anastrozole 0.5 mg tablet weekly    #Hypogonadism  -Continue testosterone 0.5 mL injection    #S/p Vasectomy      Fu in 6 months with low T labs    Scribe Attestation  By signing my name below, I, Izzy Caban, attest that this documentation  has been prepared under the direction and in the presence of Indio Felix MD.

## 2024-04-30 ENCOUNTER — OFFICE VISIT (OUTPATIENT)
Dept: PRIMARY CARE | Facility: CLINIC | Age: 39
End: 2024-04-30
Payer: COMMERCIAL

## 2024-04-30 VITALS
BODY MASS INDEX: 25.06 KG/M2 | OXYGEN SATURATION: 96 % | WEIGHT: 185 LBS | HEIGHT: 72 IN | SYSTOLIC BLOOD PRESSURE: 104 MMHG | HEART RATE: 72 BPM | DIASTOLIC BLOOD PRESSURE: 78 MMHG

## 2024-04-30 DIAGNOSIS — R79.89 LOW TESTOSTERONE: ICD-10-CM

## 2024-04-30 DIAGNOSIS — M35.00 SJOGREN'S SYNDROME, WITH UNSPECIFIED ORGAN INVOLVEMENT (MULTI): Primary | ICD-10-CM

## 2024-04-30 DIAGNOSIS — J30.9 ALLERGIC RHINITIS, UNSPECIFIED SEASONALITY, UNSPECIFIED TRIGGER: ICD-10-CM

## 2024-04-30 DIAGNOSIS — F90.9 ATTENTION DEFICIT HYPERACTIVITY DISORDER (ADHD), UNSPECIFIED ADHD TYPE: ICD-10-CM

## 2024-04-30 PROCEDURE — 1036F TOBACCO NON-USER: CPT | Performed by: STUDENT IN AN ORGANIZED HEALTH CARE EDUCATION/TRAINING PROGRAM

## 2024-04-30 PROCEDURE — 99213 OFFICE O/P EST LOW 20 MIN: CPT | Performed by: STUDENT IN AN ORGANIZED HEALTH CARE EDUCATION/TRAINING PROGRAM

## 2024-04-30 RX ORDER — FLUTICASONE PROPIONATE 50 MCG
1 SPRAY, SUSPENSION (ML) NASAL 2 TIMES DAILY
Qty: 16 G | Refills: 6 | Status: SHIPPED | OUTPATIENT
Start: 2024-04-30

## 2024-04-30 RX ORDER — AZELASTINE 1 MG/ML
1 SPRAY, METERED NASAL 2 TIMES DAILY
Qty: 30 ML | Refills: 6 | Status: SHIPPED | OUTPATIENT
Start: 2024-04-30

## 2024-04-30 ASSESSMENT — PATIENT HEALTH QUESTIONNAIRE - PHQ9
SUM OF ALL RESPONSES TO PHQ9 QUESTIONS 1 AND 2: 0
2. FEELING DOWN, DEPRESSED OR HOPELESS: NOT AT ALL
1. LITTLE INTEREST OR PLEASURE IN DOING THINGS: NOT AT ALL

## 2024-04-30 NOTE — PROGRESS NOTES
Subjective   Patient ID: Andrew Grant is a 38 y.o. male who presents for Allergic Rhinitis.  Today he is accompanied by alone.     HPI  1. Sjogren's Syndrome  Has been following rhuematology, Dr. Connolly  Following further protocol  Is taking sulfasalazine 500 mg twice a day, OTC Biotene, and eyedrops.  Joint pain is decreased, but dry eye and mouth is not improving much.    2. ADHD  Has been taking Adderall XR 20 mg occasionally.  Continues to follow-up with a specialty provider    3. Allergic rhinitis  Symptoms flare in the spring time.  Has been taking azelastine 137 mcg and Flonase 50 mcg nasal spray, once at morning and night.  Requesting refills at today's visit    4. Low testosterone/erectile dysfunction  Receives 100 mg testosterone injection one time per week.  Currently asymptomatic and feels well  Following urology, Dr. Felix    Current Outpatient Medications on File Prior to Visit   Medication Sig Dispense Refill    amphetamine-dextroamphetamine XR (Adderall XR) 20 mg 24 hr capsule Take 1 capsule (20 mg) by mouth once daily. For ADHD      cholecalciferol (Vitamin D-3) 5,000 Units tablet Take 1 tablet (5,000 Units) by mouth 1 (one) time per week.      omega 3-dha-epa-fish oil (Fish OiL) 1,000 mg (120 mg-180 mg) capsule Take by mouth.      sulfaSALAzine (Azulfidine) 500 mg tablet Take 1 tablet (500 mg) by mouth 2 times a day. 60 tablet 1    tadalafil (Cialis) 20 mg tablet Take 1 tablet (20 mg) by mouth if needed for erectile dysfunction (Start with half tab. Take 2 hours prior to wanting erection.If you get an erection over 4 hours, go to the emergency room.). 30 tablet 6    testosterone cypionate (Depo-Testosterone) 200 mg/mL injection Inject 0.5 mL (100 mg) into the muscle 1 (one) time per week. 4 mL 5    [DISCONTINUED] azelastine (Astelin) 137 mcg (0.1 %) nasal spray Administer 1 spray into each nostril 2 times a day.      [DISCONTINUED] fluticasone (Flonase) 50 mcg/actuation nasal spray  "Administer 1 spray into each nostril 2 times a day.      [DISCONTINUED] needle, disp, 18 G 18 gauge x 1 1/2\" needle Use to draw up testosterone injections from vial 16 each 3    [DISCONTINUED] syringe with needle (Monoject Syringe) 3 mL 23 x 1\" syringe Use one syringe and needle for each IM injection, associated with testosterone order. Once a week 16 each 3     No current facility-administered medications on file prior to visit.        Allergies   Allergen Reactions    Amoxicillin-Pot Clavulanate Hives    Potassium Clavulanate Unknown       Immunization History   Administered Date(s) Administered    Hep A / Hep B 01/19/2018    Hep A, Unspecified 03/16/2018    Hep B, Unspecified 03/16/2018    Influenza, seasonal, injectable 11/12/2019    Pfizer Purple Cap SARS-CoV-2 09/08/2021, 09/29/2021    Td vaccine, age 7 years and older (TENIVAC) 01/19/2018         Review of Systems  All pertinent positive symptoms are included in the history of present illness.  All other systems have been reviewed and are negative and noncontributory to this patient's current ailments.     Objective   /78 (BP Location: Left arm, Patient Position: Sitting, BP Cuff Size: Adult)   Pulse 72   Ht 1.829 m (6')   Wt 83.9 kg (185 lb)   SpO2 96%   BMI 25.09 kg/m²   BSA: 2.06 meters squared  No visits with results within 1 Month(s) from this visit.   Latest known visit with results is:   Lab on 02/19/2024   Component Date Value Ref Range Status    Creatinine 02/19/2024 0.93  0.50 - 1.30 mg/dL Final    eGFR 02/19/2024 >90  >60 mL/min/1.73m*2 Final    Calculations of estimated GFR are performed using the 2021 CKD-EPI Study Refit equation without the race variable for the IDMS-Traceable creatinine methods.  https://jasn.asnjournals.org/content/early/2021/09/22/ASN.6025394224    Follicle Stimulating Hormone 02/19/2024 <0.9  IU/L Final    FSH Ref Values  Follicular   2.0-12.0  IU/L  Mid-Cycle        12.0-25.0  IU/L  Luteal Phase      2.0-12.0  " IU/L  Menopause       30.0-150.0  IU/L  Pre-puberty     50% Adult IU/L  Adult Male        2.0-10.0  IU/L   Infants          0.0-1.0  IU/L    Luteinizing Hormone 02/19/2024 <0.1  IU/L Final    LH Reference Values  Follicular Phase          1.9-12.5 IU/L  Mid-Cycle                 8.7-76.3 IU/L  Luteal Phase              0.5-16.9 IU/L  Post Menopause            5.0-55.2 IU/L  Children                    0- 6.0 IU/L  Adult Male 18-70 years    1.5- 9.3 IU/L  Adult Male >70 years      3.1-34.6 IU/L    Hematocrit 02/19/2024 50.0  41.0 - 52.0 % Final    Prolactin 02/19/2024 9.7  2.0 - 18.0 ug/L Final    Testosterone 02/19/2024 772  240 - 1,000 ng/dL Final    Estradiol 02/19/2024 140  pg/mL Final    Prostate Specific AG 02/19/2024 <0.10  <=4.00 ng/mL Final       Physical Exam  CONSTITUTIONAL - well nourished, well developed, looks like stated age, in no acute distress, not ill-appearing, and not tired appearing  SKIN - normal skin color and pigmentation, normal skin turgor without rash, lesions, or nodules visualized  HEAD - no trauma, normocephalic  EYES - normal external exam  CHEST -no distressed breathing, good effort  EXTREMITIES - no edema, no deformities  NEUROLOGICAL - normal balance, normal motor, no ataxia  PSYCHIATRIC - alert, pleasant and cordial, age-appropriate    Assessment/Plan   1. Sjogren's Syndrome  Continue following rhuematology, Dr. Connolly  Continue taking sulfasalazine 500 mg twice a day, OTC Biotene, and eyedrops    2. ADHD  Continue taking Adderall XR 20 mg once daily,  Otherwise continue following up with psychiatry    3. Allergic rhinitis  Continue taking azelastine 137 mcg and Flonase 50 mcg nasal spray, once at morning and night, as needed.  A requisition for refills placed in your chart    4. Low testosterone/erectile dysfunction  Continue 100 mg testosterone injection one time per week  Continue following urology, Dr. Felix    Please follow-up in the fall 2024 for your physical  examination

## 2024-07-26 ENCOUNTER — OFFICE VISIT (OUTPATIENT)
Dept: RHEUMATOLOGY | Facility: CLINIC | Age: 39
End: 2024-07-26
Payer: COMMERCIAL

## 2024-07-26 VITALS — WEIGHT: 185 LBS | BODY MASS INDEX: 25.09 KG/M2

## 2024-07-26 DIAGNOSIS — Q87.19 SJOGREN-LARSSON SYNDROME (HHS-HCC): Primary | ICD-10-CM

## 2024-07-26 PROCEDURE — 1036F TOBACCO NON-USER: CPT | Performed by: INTERNAL MEDICINE

## 2024-07-26 PROCEDURE — 99213 OFFICE O/P EST LOW 20 MIN: CPT | Performed by: INTERNAL MEDICINE

## 2024-07-26 RX ORDER — SULFASALAZINE 500 MG/1
500 TABLET ORAL 2 TIMES DAILY
Qty: 180 TABLET | Refills: 1 | Status: SHIPPED | OUTPATIENT
Start: 2024-07-26 | End: 2024-10-24

## 2024-07-26 NOTE — PATIENT INSTRUCTIONS
Continue sulfasalazine twice a day.  Continue eyedrops and over-the-counter products for dry mouth.  Call me if any question.  Follow-up in 6 months or sooner if needed.

## 2024-07-26 NOTE — PROGRESS NOTES
Subjective . Andrew Grant is a 39 y.o. male who presents for Follow-up.    HPI. 39-year-old male with history of Sjogren's syndrome (+ve JORDEN, SSA and RF), ADHD, testosterone deficiency and bilateral pterygium presented for follow-up.     Last OV in September 2023.    He stated that he is feeling fine.  However stopped taking sulfasalazine few months ago and noted worsening pain in hips that improved after resuming the sulfasalazine.  Sicca symptoms tolerable.    Rheum. Meds: Sulfasalazine. (5/2022).      Past immunosuppressive therapy:  1.: Hydroxychloroquine. 1/25/2021- 1/15/2022. Mood irritability and anxiety.    Review of Systems   All other systems reviewed and are negative.    Objective     Weight 83.9 kg (185 lb).    Physical Exam  Gen. AAO x3, NAD.  HEENT: No pallor or icterus, PERRLA, EOMI. No cervical lymphadenopathy .  Skin: No rashes.  Heart: S1, S2/ RRR.   Lungs: CTA B.  Abdomen: Soft, NT/ND.  MSK: No.swelling or tenderness of the  upper or lower extremity joints with full ROM, Neck,spine and Raymond SI with out tenderness.  Neuro:  Sensation to touch intact.Strength 5/5 throughout.   Psych:Appropriate mood and behavior  EXT: No edema       Assessment/Plan . 39-year-old male with history of Sjogren's syndrome (+ve JORDEN, SSA and RF), ADHD, testosterone deficiency and bilateral pterygium presented for follow-up.      #1: Sjogren syndrome.  -Continue sulfasalazine twice a day.  -Continue eyedrops and over-the-counter Biotene products.  -Labs.    Follow-up in 6 months.     This note was partially generated using the Dragon Voice recognition system. There may be some incorrect wording, spelling and/or spelling errors or punctuation errors that were not corrected prior to committing the note to the medical record.      Problem List Items Addressed This Visit    None  Visit Diagnoses       Sjogren-Allan syndrome (HHS-HCC)    -  Primary    Relevant Medications    sulfaSALAzine (Azulfidine) 500 mg tablet    Other  Relevant Orders    CBC    C-Reactive Protein    Hepatic Function Panel    Sedimentation Rate                 Active Ambulatory Problems     Diagnosis Date Noted    Allergic conjunctivitis of both eyes 03/03/2023    Allergic rhinitis 03/03/2023    JORDEN positive 03/03/2023    Bacterial URI 03/03/2023    Bradycardia 03/03/2023    Cough 03/03/2023    Dry eyes, bilateral 03/03/2023    ED (erectile dysfunction) 03/03/2023    Encephalopathy 03/03/2023    Estrogen deficiency 03/03/2023    Eye redness 03/03/2023    Fatigue 03/03/2023    Increased urinary frequency 03/03/2023    Insulin-like growth factor 1 (IGF-1) deficiency 03/03/2023    Large physiologic cupping of optic disc 03/03/2023    Low testosterone 03/03/2023    Male infertility 03/03/2023    Memory loss 03/03/2023    Muscle weakness 03/03/2023    Overweight (BMI 25.0-29.9) 03/03/2023    Parotid gland pain 03/03/2023    Polyuria 03/03/2023    Pterygium of both eyes 03/03/2023    Shortness of breath 03/03/2023    Sinus congestion 03/03/2023    Sjogren's syndrome (Multi) 03/03/2023    Swelling of left parotid gland 03/03/2023    ADHD 03/03/2023    Brain fog 08/24/2023    Small fiber neuropathy 08/24/2023     Resolved Ambulatory Problems     Diagnosis Date Noted    No Resolved Ambulatory Problems     Past Medical History:   Diagnosis Date    Allergy status to unspecified drugs, medicaments and biological substances     Personal history of other specified conditions 11/09/2022       No family history on file.    Past Surgical History:   Procedure Laterality Date    OTHER SURGICAL HISTORY  07/02/2020    Jaw surgery    OTHER SURGICAL HISTORY  07/02/2020    Hand surgery    OTHER SURGICAL HISTORY  07/02/2020    Hernia repair       Social History     Tobacco Use   Smoking Status Never   Smokeless Tobacco Never       Allergies  Amoxicillin-pot clavulanate and Potassium clavulanate    Current Meds  Current Outpatient Medications   Medication Instructions     amphetamine-dextroamphetamine XR (Adderall XR) 20 mg 24 hr capsule 1 capsule, oral, Daily, For ADHD    azelastine (Astelin) 137 mcg (0.1 %) nasal spray 1 spray, Each Nostril, 2 times daily    cholecalciferol (Vitamin D-3) 5,000 Units tablet 1 tablet, oral, Once Weekly    fluticasone (Flonase) 50 mcg/actuation nasal spray 1 spray, Each Nostril, 2 times daily    omega 3-dha-epa-fish oil (Fish OiL) 1,000 mg (120 mg-180 mg) capsule oral    sulfaSALAzine (AZULFIDINE) 500 mg, oral, 2 times daily    tadalafil (CIALIS) 20 mg, oral, As needed    testosterone cypionate (DEPO-TESTOSTERONE) 100 mg, intramuscular, Once Weekly        Lab Results   Component Value Date    C3 106 10/19/2023    RF 46 (H) 11/04/2020    SEDRATE 12 10/19/2023    CRP <0.10 10/19/2023    DNADS <1.0 10/19/2023    CKTOTAL 76 05/17/2022             Cr Connolly MD

## 2024-08-26 ENCOUNTER — LAB (OUTPATIENT)
Dept: LAB | Facility: LAB | Age: 39
End: 2024-08-26
Payer: COMMERCIAL

## 2024-08-26 DIAGNOSIS — E29.1 HYPOGONADISM IN MALE: ICD-10-CM

## 2024-08-26 DIAGNOSIS — Q87.19 SJOGREN-LARSSON SYNDROME (HHS-HCC): ICD-10-CM

## 2024-08-26 DIAGNOSIS — Z12.5 PROSTATE CANCER SCREENING: ICD-10-CM

## 2024-08-26 LAB
ALBUMIN SERPL BCP-MCNC: 4.4 G/DL (ref 3.4–5)
ALP SERPL-CCNC: 55 U/L (ref 33–120)
ALT SERPL W P-5'-P-CCNC: 13 U/L (ref 10–52)
AST SERPL W P-5'-P-CCNC: 15 U/L (ref 9–39)
BILIRUB DIRECT SERPL-MCNC: 0.1 MG/DL (ref 0–0.3)
BILIRUB SERPL-MCNC: 0.5 MG/DL (ref 0–1.2)
CRP SERPL-MCNC: <0.1 MG/DL
ERYTHROCYTE [DISTWIDTH] IN BLOOD BY AUTOMATED COUNT: 12.8 % (ref 11.5–14.5)
ERYTHROCYTE [SEDIMENTATION RATE] IN BLOOD BY WESTERGREN METHOD: 6 MM/H (ref 0–15)
ESTRADIOL SERPL-MCNC: 35 PG/ML
HCT VFR BLD AUTO: 46.7 % (ref 41–52)
HGB BLD-MCNC: 15.4 G/DL (ref 13.5–17.5)
LH SERPL-ACNC: 0.1 IU/L
MCH RBC QN AUTO: 29.7 PG (ref 26–34)
MCHC RBC AUTO-ENTMCNC: 33 G/DL (ref 32–36)
MCV RBC AUTO: 90 FL (ref 80–100)
NRBC BLD-RTO: 0 /100 WBCS (ref 0–0)
PLATELET # BLD AUTO: 172 X10*3/UL (ref 150–450)
PROT SERPL-MCNC: 7.5 G/DL (ref 6.4–8.2)
PSA SERPL-MCNC: 0.6 NG/ML
RBC # BLD AUTO: 5.19 X10*6/UL (ref 4.5–5.9)
TESTOST SERPL-MCNC: 1006 NG/DL (ref 240–1000)
WBC # BLD AUTO: 7 X10*3/UL (ref 4.4–11.3)

## 2024-08-26 PROCEDURE — 86140 C-REACTIVE PROTEIN: CPT

## 2024-08-26 PROCEDURE — 84403 ASSAY OF TOTAL TESTOSTERONE: CPT

## 2024-08-26 PROCEDURE — 36415 COLL VENOUS BLD VENIPUNCTURE: CPT

## 2024-08-26 PROCEDURE — 84153 ASSAY OF PSA TOTAL: CPT

## 2024-08-26 PROCEDURE — 82670 ASSAY OF TOTAL ESTRADIOL: CPT

## 2024-08-26 PROCEDURE — 85027 COMPLETE CBC AUTOMATED: CPT

## 2024-08-26 PROCEDURE — 80076 HEPATIC FUNCTION PANEL: CPT

## 2024-08-26 PROCEDURE — 83002 ASSAY OF GONADOTROPIN (LH): CPT

## 2024-08-26 PROCEDURE — 85652 RBC SED RATE AUTOMATED: CPT

## 2024-08-27 ENCOUNTER — APPOINTMENT (OUTPATIENT)
Dept: UROLOGY | Facility: CLINIC | Age: 39
End: 2024-08-27
Payer: COMMERCIAL

## 2024-08-27 DIAGNOSIS — R79.89 LOW TESTOSTERONE: Primary | ICD-10-CM

## 2024-08-27 DIAGNOSIS — R79.89 ELEVATED TESTOSTERONE LEVEL: ICD-10-CM

## 2024-08-27 DIAGNOSIS — N52.9 ERECTILE DYSFUNCTION, UNSPECIFIED ERECTILE DYSFUNCTION TYPE: ICD-10-CM

## 2024-08-27 DIAGNOSIS — E28.39 ESTROGEN DEFICIENCY: ICD-10-CM

## 2024-08-27 DIAGNOSIS — E29.1 HYPOGONADISM MALE: ICD-10-CM

## 2024-08-27 DIAGNOSIS — Z12.5 PROSTATE CANCER SCREENING: ICD-10-CM

## 2024-08-27 DIAGNOSIS — E29.1 HYPOGONADISM IN MALE: ICD-10-CM

## 2024-08-27 PROCEDURE — 99214 OFFICE O/P EST MOD 30 MIN: CPT | Performed by: UROLOGY

## 2024-08-27 PROCEDURE — G2211 COMPLEX E/M VISIT ADD ON: HCPCS | Performed by: UROLOGY

## 2024-08-27 PROCEDURE — 1036F TOBACCO NON-USER: CPT | Performed by: UROLOGY

## 2024-08-27 RX ORDER — SYRINGE W-NEEDLE,DISPOSAB,3 ML 23GX1"
SYRINGE, EMPTY DISPOSABLE MISCELLANEOUS
Qty: 16 EACH | Refills: 3 | Status: SHIPPED | OUTPATIENT
Start: 2024-08-27

## 2024-08-27 RX ORDER — TESTOSTERONE CYPIONATE 200 MG/ML
100 INJECTION, SOLUTION INTRAMUSCULAR
Qty: 5 ML | Refills: 2 | Status: SHIPPED | OUTPATIENT
Start: 2024-08-27

## 2024-08-27 NOTE — PROGRESS NOTES
"Last visit 2/2024  -Continue cialis 10mg prn  -Start anastrozole 0.5 mg tablet weekly  -Continue testosterone 0.5 mL injection    Today's visit:  Erectile Dysfunction  -Taking cialis 10mg prn  -Happy with dose, no side effects  -No prolonged erections     #Hypogonadism  -.5cc weelky  -E good  -no side effects     #Elevated estrogen  -stopped anastrozole  -Labs showed elevated levels      #Vasectomy  -Doing well      Labs  Component      Latest Ref Rng 8/26/2024   WBC      4.4 - 11.3 x10*3/uL 7.0    nRBC      0.0 - 0.0 /100 WBCs 0.0    RBC      4.50 - 5.90 x10*6/uL 5.19    HEMOGLOBIN      13.5 - 17.5 g/dL 15.4    HEMATOCRIT      41.0 - 52.0 % 46.7    MCV      80 - 100 fL 90    MCH      26.0 - 34.0 pg 29.7    MCHC      32.0 - 36.0 g/dL 33.0    RED CELL DISTRIBUTION WIDTH      11.5 - 14.5 % 12.8    Platelets      150 - 450 x10*3/uL 172    LH      IU/L 0.1    Estradiol      pg/mL 35    PSA      <=4.00 ng/mL 0.60    Testosterone      240 - 1,000 ng/dL 1,006 (H)       Legend:  (H) High    Lab Results   Component Value Date    TESTOSTERONE 1,006 (H) 08/26/2024    TESTOSTERONE 772 02/19/2024    TESTOSTERONE 625 10/19/2023     Lab Results   Component Value Date    LH 0.1 08/26/2024     Lab Results   Component Value Date    FSH <0.9 02/19/2024     No components found for: \"ESTRADIAL\"  Lab Results   Component Value Date    PSA 0.60 08/26/2024     No components found for: \"CBC\"  Lab Results   Component Value Date    PROLACTIN 9.7 02/19/2024     Lab Results   Component Value Date    HGBA1C 5.3 08/31/2023     No components found for: \"HEMATOCRIT\"      Medications:    Current Outpatient Medications:     amphetamine-dextroamphetamine XR (Adderall XR) 20 mg 24 hr capsule, Take 1 capsule (20 mg) by mouth once daily. For ADHD, Disp: , Rfl:     azelastine (Astelin) 137 mcg (0.1 %) nasal spray, Administer 1 spray into each nostril 2 times a day., Disp: 30 mL, Rfl: 6    cholecalciferol (Vitamin D-3) 5,000 Units tablet, Take 1 tablet " (5,000 Units) by mouth 1 (one) time per week., Disp: , Rfl:     fluticasone (Flonase) 50 mcg/actuation nasal spray, Administer 1 spray into each nostril 2 times a day., Disp: 16 g, Rfl: 6    omega 3-dha-epa-fish oil (Fish OiL) 1,000 mg (120 mg-180 mg) capsule, Take by mouth., Disp: , Rfl:     sulfaSALAzine (Azulfidine) 500 mg tablet, Take 1 tablet (500 mg) by mouth 2 times a day., Disp: 180 tablet, Rfl: 1    tadalafil (Cialis) 20 mg tablet, Take 1 tablet (20 mg) by mouth if needed for erectile dysfunction (Start with half tab. Take 2 hours prior to wanting erection.If you get an erection over 4 hours, go to the emergency room.)., Disp: 30 tablet, Rfl: 6    testosterone cypionate (Depo-Testosterone) 200 mg/mL injection, Inject 0.5 mL (100 mg) into the muscle 1 (one) time per week., Disp: 4 mL, Rfl: 5    Allergy:  Allergies   Allergen Reactions    Amoxicillin-Pot Clavulanate Hives    Potassium Clavulanate Unknown        Exam  CONSTITUTIONAL:        No acute distress    HEAD:        Normocephalic and atraumatic    CHEST / RESPIRATORY      no excess work of breathing, no respiratory distress,    ABDOMEN / GASTROINTESTINAL:        Abdomen nondistended          Assessment/Plan  #Erectile Dysfunction  -Continue cialis 10mg prn     #Elevated estrogen  -hold anastrozole 0.5 mg tablet weekly     #Hypogonadism  -Continue testosterone 0.5 mL injection  -T high today at peak  -will recheck peak in 2 months     #prostate ca screening  -psa    #S/p Vasectomy      I have personally reviewed the OARRS report for this patient I have considered the risks of abuse, dependence, addiction and diversion. I believe that it is clinically appropriate for him to be prescribed this medication.”  G [id number]  Visit complexity inherent to evaluation and management (E&M) associated with medical care services that serve as the continuing focal point for all needed health care services and/or with medical care services that are part of ongoing care  related to a patient's single, serious condition or a complex condition.    Fu in 6 months with low T labs

## 2024-09-05 ENCOUNTER — APPOINTMENT (OUTPATIENT)
Dept: ALLERGY | Facility: CLINIC | Age: 39
End: 2024-09-05
Payer: COMMERCIAL

## 2024-10-21 ENCOUNTER — APPOINTMENT (OUTPATIENT)
Dept: PRIMARY CARE | Facility: CLINIC | Age: 39
End: 2024-10-21
Payer: COMMERCIAL

## 2024-10-21 VITALS
BODY MASS INDEX: 24.84 KG/M2 | DIASTOLIC BLOOD PRESSURE: 74 MMHG | SYSTOLIC BLOOD PRESSURE: 126 MMHG | HEART RATE: 88 BPM | OXYGEN SATURATION: 98 % | HEIGHT: 72 IN | WEIGHT: 183.4 LBS

## 2024-10-21 DIAGNOSIS — R79.89 LOW TESTOSTERONE: ICD-10-CM

## 2024-10-21 DIAGNOSIS — J30.9 ALLERGIC RHINITIS, UNSPECIFIED SEASONALITY, UNSPECIFIED TRIGGER: ICD-10-CM

## 2024-10-21 DIAGNOSIS — E53.8 B12 DEFICIENCY: ICD-10-CM

## 2024-10-21 DIAGNOSIS — F90.9 ATTENTION DEFICIT HYPERACTIVITY DISORDER (ADHD), UNSPECIFIED ADHD TYPE: ICD-10-CM

## 2024-10-21 DIAGNOSIS — M35.00 SJOGREN'S SYNDROME, WITH UNSPECIFIED ORGAN INVOLVEMENT (MULTI): ICD-10-CM

## 2024-10-21 DIAGNOSIS — Z00.00 HEALTHCARE MAINTENANCE: Primary | ICD-10-CM

## 2024-10-21 LAB
POC APPEARANCE, URINE: CLEAR
POC BILIRUBIN, URINE: NEGATIVE
POC BLOOD, URINE: NEGATIVE
POC COLOR, URINE: YELLOW
POC GLUCOSE, URINE: NEGATIVE MG/DL
POC KETONES, URINE: NEGATIVE MG/DL
POC LEUKOCYTES, URINE: NEGATIVE
POC NITRITE,URINE: NEGATIVE
POC PH, URINE: 7 PH
POC PROTEIN, URINE: NEGATIVE MG/DL
POC SPECIFIC GRAVITY, URINE: 1.01
POC UROBILINOGEN, URINE: 0.2 EU/DL

## 2024-10-21 PROCEDURE — 81002 URINALYSIS NONAUTO W/O SCOPE: CPT | Performed by: STUDENT IN AN ORGANIZED HEALTH CARE EDUCATION/TRAINING PROGRAM

## 2024-10-21 PROCEDURE — 99395 PREV VISIT EST AGE 18-39: CPT | Performed by: STUDENT IN AN ORGANIZED HEALTH CARE EDUCATION/TRAINING PROGRAM

## 2024-10-21 PROCEDURE — 93000 ELECTROCARDIOGRAM COMPLETE: CPT | Performed by: STUDENT IN AN ORGANIZED HEALTH CARE EDUCATION/TRAINING PROGRAM

## 2024-10-21 PROCEDURE — 3008F BODY MASS INDEX DOCD: CPT | Performed by: STUDENT IN AN ORGANIZED HEALTH CARE EDUCATION/TRAINING PROGRAM

## 2024-10-21 PROCEDURE — 1036F TOBACCO NON-USER: CPT | Performed by: STUDENT IN AN ORGANIZED HEALTH CARE EDUCATION/TRAINING PROGRAM

## 2024-10-21 ASSESSMENT — PATIENT HEALTH QUESTIONNAIRE - PHQ9
2. FEELING DOWN, DEPRESSED OR HOPELESS: NOT AT ALL
1. LITTLE INTEREST OR PLEASURE IN DOING THINGS: NOT AT ALL
SUM OF ALL RESPONSES TO PHQ9 QUESTIONS 1 AND 2: 0

## 2024-10-21 NOTE — PROGRESS NOTES
"Subjective   Patient ID: Andrew Grant is a 39 y.o. male who presents for Annual Exam.  Today he is accompanied by alone.     HPI  Health maintenance  Overall patient is doing well.   Denies any chest pain, shortness of breath or wheezing upon exertion.  Denies any fever chills or constitutional symptoms, denies any unintentional weight loss.  Denies any nausea/vomiting or constipation/diarrhea.  Denies any urinary symptoms.  Denies any recent change in hearing or vision.  Denies any lightheadedness, dizziness or balance problem   Immunization: Tdap 1/2018  Influenza vaccine: Declines   COVID-19 vaccine up-to-date  Colon Cancer Screening: No family history, will resume screening at age 45  Diet: eats healthy balanced diet  Exercise: 3 times per week with weightlifting and biking, spends time playing with children  Tobacco: Denies use. Never smoker  EtOH: Drinks socially/rarely    2. Sjogren's Syndrome  Has been following rhuematology, Dr. Connolly  Following regularly as indicated the chart  Is taking sulfasalazine 500 mg twice a day, OTC Biotene, and eyedrops.  Overall feels stable at this time    3. ADHD  Has been taking Adderall XR 20 mg occasionally, it has been\" a while\" since last time he took it  Finds it helpful when needed.     4. Allergic rhinitis  Continues to follow-up with an allergy/immunology provider    5. Low testosterone/erectile dysfunction  Receives 100 mg testosterone injection one time per week.  Feels stable at this time  Following urology, Dr. Felix      Current Outpatient Medications on File Prior to Visit   Medication Sig Dispense Refill    amphetamine-dextroamphetamine XR (Adderall XR) 20 mg 24 hr capsule Take 1 capsule (20 mg) by mouth once daily. For ADHD      azelastine (Astelin) 137 mcg (0.1 %) nasal spray Administer 1 spray into each nostril 2 times a day. 30 mL 6    cholecalciferol (Vitamin D-3) 5,000 Units tablet Take 1 tablet (5,000 Units) by mouth 1 (one) time per week.      " "fluticasone (Flonase) 50 mcg/actuation nasal spray Administer 1 spray into each nostril 2 times a day. 16 g 6    needle, disp, 18 G 18 gauge x 1 1/2\" needle Use for medication draw 16 each 3    omega 3-dha-epa-fish oil (Fish OiL) 1,000 mg (120 mg-180 mg) capsule Take by mouth.      sulfaSALAzine (Azulfidine) 500 mg tablet Take 1 tablet (500 mg) by mouth 2 times a day. 180 tablet 1    syringe with needle (Monoject Syringe) 3 mL 23 x 1\" syringe Use for IM injections 16 each 3    tadalafil (Cialis) 20 mg tablet Take 1 tablet (20 mg) by mouth if needed for erectile dysfunction (Start with half tab. Take 2 hours prior to wanting erection.If you get an erection over 4 hours, go to the emergency room.). 30 tablet 6    testosterone cypionate (Depo-Testosterone) 200 mg/mL injection Inject 0.5 mL (100 mg) into the muscle 1 (one) time per week. 5 mL 2     No current facility-administered medications on file prior to visit.        Allergies   Allergen Reactions    Amoxicillin-Pot Clavulanate Hives    Potassium Clavulanate Unknown       Immunization History   Administered Date(s) Administered    Hep A / Hep B 01/19/2018    Hep A, Unspecified 03/16/2018    Hep B, Unspecified 03/16/2018    Influenza, seasonal, injectable 11/12/2019    Pfizer Purple Cap SARS-CoV-2 09/08/2021, 09/29/2021    Td vaccine, age 7 years and older (TENIVAC) 01/19/2018         Review of Systems  All pertinent positive symptoms are included in the history of present illness.  All other systems have been reviewed and are negative and noncontributory to this patient's current ailments.     Objective   /74 (BP Location: Left arm, Patient Position: Sitting, BP Cuff Size: Adult)   Pulse 88   Ht 1.829 m (6')   Wt 83.2 kg (183 lb 6.4 oz)   SpO2 98%   BMI 24.87 kg/m²   BSA: 2.06 meters squared  No visits with results within 1 Month(s) from this visit.   Latest known visit with results is:   Lab on 08/26/2024   Component Date Value Ref Range Status    " Luteinizing Hormone 08/26/2024 0.1  IU/L Final    LH Reference Values  Follicular Phase          1.9-12.5 IU/L  Mid-Cycle                 8.7-76.3 IU/L  Luteal Phase              0.5-16.9 IU/L  Post Menopause            5.0-55.2 IU/L  Children                    0- 6.0 IU/L  Adult Male 18-70 years    1.5- 9.3 IU/L  Adult Male >70 years      3.1-34.6 IU/L    Estradiol 08/26/2024 35  pg/mL Final    Prostate Specific AG 08/26/2024 0.60  <=4.00 ng/mL Final    Testosterone 08/26/2024 1,006 (H)  240 - 1,000 ng/dL Final    WBC 08/26/2024 7.0  4.4 - 11.3 x10*3/uL Final    nRBC 08/26/2024 0.0  0.0 - 0.0 /100 WBCs Final    RBC 08/26/2024 5.19  4.50 - 5.90 x10*6/uL Final    Hemoglobin 08/26/2024 15.4  13.5 - 17.5 g/dL Final    Hematocrit 08/26/2024 46.7  41.0 - 52.0 % Final    MCV 08/26/2024 90  80 - 100 fL Final    MCH 08/26/2024 29.7  26.0 - 34.0 pg Final    MCHC 08/26/2024 33.0  32.0 - 36.0 g/dL Final    RDW 08/26/2024 12.8  11.5 - 14.5 % Final    Platelets 08/26/2024 172  150 - 450 x10*3/uL Final    C-Reactive Protein 08/26/2024 <0.10  <1.00 mg/dL Final    Albumin 08/26/2024 4.4  3.4 - 5.0 g/dL Final    Bilirubin, Total 08/26/2024 0.5  0.0 - 1.2 mg/dL Final    Bilirubin, Direct 08/26/2024 0.1  0.0 - 0.3 mg/dL Final    Alkaline Phosphatase 08/26/2024 55  33 - 120 U/L Final    ALT 08/26/2024 13  10 - 52 U/L Final    Patients treated with Sulfasalazine may generate falsely decreased results for ALT.    AST 08/26/2024 15  9 - 39 U/L Final    Total Protein 08/26/2024 7.5  6.4 - 8.2 g/dL Final    Sedimentation Rate 08/26/2024 6  0 - 15 mm/h Final       Physical Exam  CONSTITUTIONAL - well nourished, well developed, looks like stated age, in no acute distress, not ill-appearing, and not tired appearing  SKIN - normal skin color and pigmentation, normal skin turgor without rash, lesions, or nodules visualized  HEAD - no trauma, normocephalic  EYES - pupils are equal and reactive to light, extraocular muscles are intact, and normal  external exam  ENT -  uvula midline, normal tongue movement and throat normal, no exudate, nasal passage without discharge and patent  NECK - supple without rigidity, no neck mass was observed, no thyromegaly or thyroid nodules  CHEST - clear to auscultation, no wheezing, no crackles and no rales, good effort  CARDIAC - regular rate and regular rhythm, no skipped beats, no murmur  ABDOMEN - no organomegaly, soft, nontender, nondistended, normal bowel sounds, no guarding/rebound/rigidity, negative McBurney sign and negative Luu sign  EXTREMITIES - no edema, no deformities  NEUROLOGICAL - DTRs equal and symmetrical; alert, oriented and no focal signs  PSYCHIATRIC - alert, pleasant and cordial, age-appropriate    Assessment/Plan   Health maintenance  Complete history and physical examination was performed  EKG reveals sinus rhythm with a short IA but otherwise no acute changes  Requisition for routine blood work was provided  We will notify of test results once available and make treatment recommendations accordingly  Please tell the well-balanced diet and excise regularly  Patient declines influenza vaccine    2. Sjogren's Syndrome  Continue following rhuematology, Dr. Connolly  Continue taking sulfasalazine 500 mg twice a day, OTC Biotene, and eyedrops    3. ADHD  Continue following up with psychiatry    4. Allergic rhinitis  Continue following up with allergy/immunology    5. Low testosterone /erectile dysfunction  Continue 100 mg testosterone injection one time per week  Continue following urology, Dr. Felix    Thank you for letting us be a part of your care team.  Please call the office if you have further questions or concerns regarding your care    Otherwise, please follow-up in 12 months for continued care and refills as well as your yearly physical examination    Conrado Hsieh MD  PGY2, FM Resident

## 2024-10-29 ENCOUNTER — LAB (OUTPATIENT)
Dept: LAB | Facility: LAB | Age: 39
End: 2024-10-29
Payer: COMMERCIAL

## 2024-10-29 ENCOUNTER — APPOINTMENT (OUTPATIENT)
Dept: UROLOGY | Facility: CLINIC | Age: 39
End: 2024-10-29
Payer: COMMERCIAL

## 2024-10-29 DIAGNOSIS — Z00.00 HEALTHCARE MAINTENANCE: ICD-10-CM

## 2024-10-29 LAB
ALBUMIN SERPL BCP-MCNC: 4.4 G/DL (ref 3.4–5)
ALP SERPL-CCNC: 55 U/L (ref 33–120)
ALT SERPL W P-5'-P-CCNC: 12 U/L (ref 10–52)
ANION GAP SERPL CALC-SCNC: 11 MMOL/L (ref 10–20)
AST SERPL W P-5'-P-CCNC: 14 U/L (ref 9–39)
BASOPHILS # BLD AUTO: 0.04 X10*3/UL (ref 0–0.1)
BASOPHILS NFR BLD AUTO: 0.7 %
BILIRUB SERPL-MCNC: 0.7 MG/DL (ref 0–1.2)
BUN SERPL-MCNC: 19 MG/DL (ref 6–23)
CALCIUM SERPL-MCNC: 9.4 MG/DL (ref 8.6–10.3)
CHLORIDE SERPL-SCNC: 103 MMOL/L (ref 98–107)
CO2 SERPL-SCNC: 30 MMOL/L (ref 21–32)
CREAT SERPL-MCNC: 0.88 MG/DL (ref 0.5–1.3)
EGFRCR SERPLBLD CKD-EPI 2021: >90 ML/MIN/1.73M*2
EOSINOPHIL # BLD AUTO: 0.16 X10*3/UL (ref 0–0.7)
EOSINOPHIL NFR BLD AUTO: 2.6 %
ERYTHROCYTE [DISTWIDTH] IN BLOOD BY AUTOMATED COUNT: 13.2 % (ref 11.5–14.5)
EST. AVERAGE GLUCOSE BLD GHB EST-MCNC: 94 MG/DL
GLUCOSE SERPL-MCNC: 51 MG/DL (ref 74–99)
HBA1C MFR BLD: 4.9 %
HCT VFR BLD AUTO: 47.5 % (ref 41–52)
HGB BLD-MCNC: 15.6 G/DL (ref 13.5–17.5)
IMM GRANULOCYTES # BLD AUTO: 0.02 X10*3/UL (ref 0–0.7)
IMM GRANULOCYTES NFR BLD AUTO: 0.3 % (ref 0–0.9)
LYMPHOCYTES # BLD AUTO: 1.97 X10*3/UL (ref 1.2–4.8)
LYMPHOCYTES NFR BLD AUTO: 32.1 %
MCH RBC QN AUTO: 29.8 PG (ref 26–34)
MCHC RBC AUTO-ENTMCNC: 32.8 G/DL (ref 32–36)
MCV RBC AUTO: 91 FL (ref 80–100)
MONOCYTES # BLD AUTO: 0.61 X10*3/UL (ref 0.1–1)
MONOCYTES NFR BLD AUTO: 10 %
NEUTROPHILS # BLD AUTO: 3.33 X10*3/UL (ref 1.2–7.7)
NEUTROPHILS NFR BLD AUTO: 54.3 %
NRBC BLD-RTO: 0 /100 WBCS (ref 0–0)
PLATELET # BLD AUTO: 186 X10*3/UL (ref 150–450)
POTASSIUM SERPL-SCNC: 4.1 MMOL/L (ref 3.5–5.3)
PROT SERPL-MCNC: 7.3 G/DL (ref 6.4–8.2)
RBC # BLD AUTO: 5.24 X10*6/UL (ref 4.5–5.9)
SODIUM SERPL-SCNC: 140 MMOL/L (ref 136–145)
TSH SERPL-ACNC: 1.88 MIU/L (ref 0.44–3.98)
WBC # BLD AUTO: 6.1 X10*3/UL (ref 4.4–11.3)

## 2024-10-29 PROCEDURE — 36415 COLL VENOUS BLD VENIPUNCTURE: CPT

## 2024-10-29 PROCEDURE — 85025 COMPLETE CBC W/AUTO DIFF WBC: CPT

## 2024-10-29 PROCEDURE — 83036 HEMOGLOBIN GLYCOSYLATED A1C: CPT

## 2024-10-29 PROCEDURE — 80053 COMPREHEN METABOLIC PANEL: CPT

## 2024-10-29 PROCEDURE — 84443 ASSAY THYROID STIM HORMONE: CPT

## 2024-10-30 DIAGNOSIS — Z12.5 PROSTATE CANCER SCREENING: ICD-10-CM

## 2024-10-30 DIAGNOSIS — E29.1 HYPOGONADISM IN MALE: ICD-10-CM

## 2024-11-13 ENCOUNTER — LAB (OUTPATIENT)
Dept: LAB | Facility: LAB | Age: 39
End: 2024-11-13
Payer: COMMERCIAL

## 2024-11-13 DIAGNOSIS — Z00.00 HEALTHCARE MAINTENANCE: ICD-10-CM

## 2024-11-13 DIAGNOSIS — Z12.5 PROSTATE CANCER SCREENING: ICD-10-CM

## 2024-11-13 DIAGNOSIS — E29.1 HYPOGONADISM IN MALE: ICD-10-CM

## 2024-11-13 LAB
CHOLEST SERPL-MCNC: 150 MG/DL (ref 0–199)
CHOLESTEROL/HDL RATIO: 4.6
ESTRADIOL SERPL-MCNC: <19 PG/ML
HCT VFR BLD AUTO: 47.4 % (ref 41–52)
HDLC SERPL-MCNC: 32.6 MG/DL
LDLC SERPL CALC-MCNC: 104 MG/DL
LH SERPL-ACNC: 0.1 IU/L
NON HDL CHOLESTEROL: 117 MG/DL (ref 0–149)
PSA SERPL-MCNC: 0.78 NG/ML
TESTOST SERPL-MCNC: 872 NG/DL (ref 240–1000)
TRIGL SERPL-MCNC: 67 MG/DL (ref 0–149)
VLDL: 13 MG/DL (ref 0–40)

## 2024-11-13 PROCEDURE — 85014 HEMATOCRIT: CPT

## 2024-11-13 PROCEDURE — 36415 COLL VENOUS BLD VENIPUNCTURE: CPT

## 2024-11-13 PROCEDURE — 80061 LIPID PANEL: CPT

## 2024-11-13 PROCEDURE — 84153 ASSAY OF PSA TOTAL: CPT

## 2024-11-13 PROCEDURE — 83002 ASSAY OF GONADOTROPIN (LH): CPT

## 2024-11-13 PROCEDURE — 82670 ASSAY OF TOTAL ESTRADIOL: CPT

## 2024-11-13 PROCEDURE — 84403 ASSAY OF TOTAL TESTOSTERONE: CPT

## 2024-12-18 DIAGNOSIS — E29.1 HYPOGONADISM MALE: ICD-10-CM

## 2024-12-18 DIAGNOSIS — E29.1 HYPOGONADISM IN MALE: ICD-10-CM

## 2024-12-18 RX ORDER — TESTOSTERONE CYPIONATE 200 MG/ML
100 INJECTION, SOLUTION INTRAMUSCULAR
Qty: 5 ML | Refills: 2 | Status: SHIPPED | OUTPATIENT
Start: 2024-12-22

## 2024-12-18 RX ORDER — SYRINGE W-NEEDLE,DISPOSAB,3 ML 23GX1"
SYRINGE, EMPTY DISPOSABLE MISCELLANEOUS
Qty: 16 EACH | Refills: 3 | Status: SHIPPED | OUTPATIENT
Start: 2024-12-18

## 2024-12-30 ENCOUNTER — OFFICE VISIT (OUTPATIENT)
Dept: NEUROLOGY | Facility: CLINIC | Age: 39
End: 2024-12-30
Payer: COMMERCIAL

## 2024-12-30 VITALS
HEIGHT: 72 IN | DIASTOLIC BLOOD PRESSURE: 70 MMHG | SYSTOLIC BLOOD PRESSURE: 106 MMHG | HEART RATE: 59 BPM | WEIGHT: 178 LBS | BODY MASS INDEX: 24.11 KG/M2

## 2024-12-30 DIAGNOSIS — G93.40 ENCEPHALOPATHY, UNSPECIFIED TYPE: ICD-10-CM

## 2024-12-30 DIAGNOSIS — E88.9 PERIPHERAL NEUROPATHY DUE TO METABOLIC DISORDER (MULTI): Primary | ICD-10-CM

## 2024-12-30 DIAGNOSIS — G63 PERIPHERAL NEUROPATHY DUE TO METABOLIC DISORDER (MULTI): Primary | ICD-10-CM

## 2024-12-30 PROCEDURE — 99214 OFFICE O/P EST MOD 30 MIN: CPT | Performed by: PSYCHIATRY & NEUROLOGY

## 2024-12-30 PROCEDURE — 3008F BODY MASS INDEX DOCD: CPT | Performed by: PSYCHIATRY & NEUROLOGY

## 2024-12-30 NOTE — PROGRESS NOTES
Subjective   Andrew Grant is a 39 y.o.   male.  HPI  This is a 39 year old man with a history of tingling, numbness in the hands and feet, last seen in 4/2024.  His symptoms are about the same.  His intermittent brain fog can last up to 2 hours or 2 days.  His NCS/EMG on the right side was normal in 4/2023.  His Autonomic Report in 6/2023 was normal, including tilt table test, without evidence of a small fiber neuropathy.  He was seen by a neurologist (), 5/3/2022, was treated with Adderall  for ADD- helped at work, but he still had brain fog symptoms.  His biggest concerns are the brain fog and muscle weakness.  Objective   Neurological Exam  Physical Exam  I personally reviewed laboratory, radiographic, and medical studies which were pertinent for nothing.    Assessment/Plan

## 2024-12-30 NOTE — PATIENT INSTRUCTIONS
I will do some research to see if there is a good indication for the skin biopsies to rule in small fiber neuropathy and whether that would explain some of your symptoms other than the numbness and tingling.  I will also research the clinical note from the previous  neurologist.

## 2025-01-01 PROBLEM — G63 PERIPHERAL NEUROPATHY DUE TO METABOLIC DISORDER (MULTI): Status: ACTIVE | Noted: 2025-01-01

## 2025-01-01 PROBLEM — E88.9 PERIPHERAL NEUROPATHY DUE TO METABOLIC DISORDER (MULTI): Status: ACTIVE | Noted: 2025-01-01

## 2025-01-02 ENCOUNTER — TELEPHONE (OUTPATIENT)
Dept: NEUROLOGY | Facility: CLINIC | Age: 40
End: 2025-01-02
Payer: COMMERCIAL

## 2025-01-02 DIAGNOSIS — G93.49 ENCEPHALOPATHY CHRONIC: Primary | ICD-10-CM

## 2025-01-20 ENCOUNTER — APPOINTMENT (OUTPATIENT)
Dept: NEUROLOGY | Facility: CLINIC | Age: 40
End: 2025-01-20
Payer: COMMERCIAL

## 2025-01-28 ENCOUNTER — APPOINTMENT (OUTPATIENT)
Dept: RHEUMATOLOGY | Facility: CLINIC | Age: 40
End: 2025-01-28
Payer: COMMERCIAL

## 2025-02-25 NOTE — PROGRESS NOTES
"Last visit 8/27/24  #Erectile Dysfunction  -Continue cialis 10mg prn  #Elevated estrogen  -hold anastrozole 0.5 mg tablet weekly  #Hypogonadism  -Continue testosterone 0.5 mL injection  -T high today at peak  -will recheck peak in 2 months  #prostate ca screening  -psa  #S/p Vasectomy    Today's visit:  Erectile Dysfunction  -Taking cialis 10mg prn  -Happy with dose, no side effects  -No prolonged erections     #Hypogonadism  -.5cc weekly--> going well   -E good  -no side effects  -sex drive, libido good  -Denies any breast enlargement or tenderness     #Elevated estrogen  -stopped anastrozole  -Labs showed elevated levels      #Post-Vasectomy  -Doing well  -SA on 5/31/23 was azoospermic       Labs  Component      Latest Ref Rng 8/26/2024   WBC      4.4 - 11.3 x10*3/uL 7.0    nRBC      0.0 - 0.0 /100 WBCs 0.0    RBC      4.50 - 5.90 x10*6/uL 5.19    HEMOGLOBIN      13.5 - 17.5 g/dL 15.4    HEMATOCRIT      41.0 - 52.0 % 46.7    MCV      80 - 100 fL 90    MCH      26.0 - 34.0 pg 29.7    MCHC      32.0 - 36.0 g/dL 33.0    RED CELL DISTRIBUTION WIDTH      11.5 - 14.5 % 12.8    Platelets      150 - 450 x10*3/uL 172    LH      IU/L 0.1    Estradiol      pg/mL 35    PSA      <=4.00 ng/mL 0.60    Testosterone      240 - 1,000 ng/dL 1,006 (H)        Lab Results   Component Value Date    TESTOSTERONE 872 11/13/2024    TESTOSTERONE 1,006 (H) 08/26/2024    TESTOSTERONE 772 02/19/2024     Lab Results   Component Value Date    LH 0.1 11/13/2024     Lab Results   Component Value Date    FSH <0.9 02/19/2024     Lab Results   Component Value Date    ESTRADIOL <19 11/13/2024       Lab Results   Component Value Date    PSA 0.78 11/13/2024     No components found for: \"CBC\"  Lab Results   Component Value Date    PROLACTIN 9.7 02/19/2024     Lab Results   Component Value Date    HGBA1C 4.9 10/29/2024     No components found for: \"HEMATOCRIT\"      Medications:    Current Outpatient Medications:     amphetamine-dextroamphetamine XR " (Adderall XR) 20 mg 24 hr capsule, Take 1 capsule (20 mg) by mouth once daily. For ADHD, Disp: , Rfl:     azelastine (Astelin) 137 mcg (0.1 %) nasal spray, Administer 1 spray into each nostril 2 times a day., Disp: 30 mL, Rfl: 6    cholecalciferol (Vitamin D-3) 5,000 Units tablet, Take 1 tablet (5,000 Units) by mouth 1 (one) time per week., Disp: , Rfl:     fluticasone (Flonase) 50 mcg/actuation nasal spray, Administer 1 spray into each nostril 2 times a day., Disp: 16 g, Rfl: 6    omega 3-dha-epa-fish oil (Fish OiL) 1,000 mg (120 mg-180 mg) capsule, Take by mouth., Disp: , Rfl:     testosterone cypionate (Depo-Testosterone) 200 mg/mL injection, Inject 0.5 mL (100 mg) into the muscle 1 (one) time per week., Disp: 5 mL, Rfl: 2    sulfaSALAzine (Azulfidine) 500 mg tablet, Take 1 tablet (500 mg) by mouth 2 times a day., Disp: 180 tablet, Rfl: 1    tadalafil (Cialis) 20 mg tablet, Take 1 tablet (20 mg) by mouth if needed for erectile dysfunction (Start with half tab. Take 2 hours prior to wanting erection.If you get an erection over 4 hours, go to the emergency room.)., Disp: 30 tablet, Rfl: 6    Allergy:  Allergies   Allergen Reactions    Amoxicillin-Pot Clavulanate Hives    Potassium Clavulanate Unknown        Exam  CONSTITUTIONAL:        No acute distress    HEAD:        Normocephalic and atraumatic    CHEST / RESPIRATORY      no excess work of breathing, no respiratory distress,    ABDOMEN / GASTROINTESTINAL:        Abdomen nondistended        Assessment/Plan  #Erectile Dysfunction  -Continue cialis 10mg prn     #Elevated estrogen  -hold anastrozole 0.5 mg tablet weekly     #Hypogonadism  -Continue testosterone 0.5 mL injectionweekly  -T labs pending  -Will do low T labs today, if wnl continue same regimen     #prostate ca screening  -psa    #S/p Vasectomy   -doing well    Fu in 6 months with NP with low T labs prior if labs ok today     I have personally reviewed the OARRS report for this patient I have considered  the risks of abuse, dependence, addiction and diversion. I believe that it is clinically appropriate for him to be prescribed this medication.”  G [id number]  Visit complexity inherent to evaluation and management (E&M) associated with medical care services that serve as the continuing focal point for all needed health care services and/or with medical care services that are part of ongoing care related to a patient's single, serious condition or a complex condition.    Fu in 6 months with low T labswith Carrie

## 2025-02-26 ENCOUNTER — APPOINTMENT (OUTPATIENT)
Dept: UROLOGY | Facility: CLINIC | Age: 40
End: 2025-02-26
Payer: COMMERCIAL

## 2025-02-26 DIAGNOSIS — N52.9 ERECTILE DYSFUNCTION, UNSPECIFIED ERECTILE DYSFUNCTION TYPE: Primary | ICD-10-CM

## 2025-02-26 DIAGNOSIS — E29.1 HYPOGONADISM IN MALE: ICD-10-CM

## 2025-02-26 DIAGNOSIS — Z12.5 PROSTATE CANCER SCREENING: ICD-10-CM

## 2025-02-26 PROCEDURE — 99214 OFFICE O/P EST MOD 30 MIN: CPT | Performed by: UROLOGY

## 2025-02-26 PROCEDURE — G2211 COMPLEX E/M VISIT ADD ON: HCPCS | Performed by: UROLOGY

## 2025-02-26 PROCEDURE — 1036F TOBACCO NON-USER: CPT | Performed by: UROLOGY

## 2025-03-04 ENCOUNTER — OFFICE VISIT (OUTPATIENT)
Dept: NEUROLOGY | Facility: HOSPITAL | Age: 40
End: 2025-03-04
Payer: COMMERCIAL

## 2025-03-04 VITALS
DIASTOLIC BLOOD PRESSURE: 66 MMHG | BODY MASS INDEX: 24.11 KG/M2 | RESPIRATION RATE: 18 BRPM | SYSTOLIC BLOOD PRESSURE: 130 MMHG | HEART RATE: 64 BPM | TEMPERATURE: 97.1 F | WEIGHT: 178 LBS | HEIGHT: 72 IN

## 2025-03-04 DIAGNOSIS — G93.49 ENCEPHALOPATHY CHRONIC: ICD-10-CM

## 2025-03-04 PROCEDURE — 3008F BODY MASS INDEX DOCD: CPT | Performed by: PSYCHIATRY & NEUROLOGY

## 2025-03-04 PROCEDURE — 99204 OFFICE O/P NEW MOD 45 MIN: CPT | Performed by: PSYCHIATRY & NEUROLOGY

## 2025-03-04 PROCEDURE — 1036F TOBACCO NON-USER: CPT | Performed by: PSYCHIATRY & NEUROLOGY

## 2025-03-04 PROCEDURE — 99214 OFFICE O/P EST MOD 30 MIN: CPT | Mod: GC | Performed by: PSYCHIATRY & NEUROLOGY

## 2025-03-04 ASSESSMENT — PAIN SCALES - GENERAL: PAINLEVEL_OUTOF10: 0-NO PAIN

## 2025-03-04 NOTE — PATIENT INSTRUCTIONS
Juanita,    Thank you for letting us take part in your care! You were seen in the [facility] today for a demyelinating disease (specifically multiple sclerosis or NMOSD). Although there is a statistical and mechanistic association of these with rheumatic disorders (particularly between Sjogren's and NMOSD), we currently see no evidence of these demyelinating disorders, which is excellent! You can continue to follow with Dr. James as an outpatient.    Thank you,    Jaylen Figueroa MD (Neurology Resident)  Alejandro Romo MD, PhD (Neuroimmunology Attending)  [facility]

## 2025-03-04 NOTE — PROGRESS NOTES
"  [facility] OUTPATIENT CONSULT NOTE  Subjective   Consult Concern: evaluate for MS or NMOSD  History of Presenting Illness  Andrew Grant is a 39 y.o. right handed male with a history notable for ADHD, hypogonadism, and Sjogren's Syndrome (on sulfasalazine) who presents to the neurology clinic for evaluation of attacks of brain fog. He is referred by Dr. Gregorio James to evaluate for MS or NMOSD.    Patient states health issues began around 15 years ago when living in [place]. He had significant GI upset and went to a gastroenterologist and received a colonoscopy. He was noted to have a borderline elevated JORDEN. He moved to [place] around 8-9 years ago, and he began suffering attacks around that time.    Attacks generate rapidly: he suddenly begins \"feeling stupid\" and feels unable to collect thoughts. Feels like there is \"an attack within my body.\" Simultaneously, both eyes become \"beet red\" and even drier than usual. He will stay in this state until it \"turns off\" which can again just be like \"the flip of a switch.\" They will last anywhere from hours to days. He also notes that his right ear may become very red and sensitive comorbidly. Sometimes a feeling of tightness in the chest. There is no associated headache, N/V, photo/phono/osmo/kinesophobia. No other autonomic manifestations that he can think of. He has not identified any potential triggers despite keeping a log. Not associated with his Adderall or any other medications he can think of. Attacks cluster together; currently they are daily or every-other-day. He has gone weeks between attacks in the past. No similar history in the family.    Around 5 years ago, he began suffering from dry eyes and mouth, so he went back for testing when he was diagnosed with Sjogren's Syndrome. Patient was initially on hydroxychloroquine, but the patient felt very irritable on the medication and asked to be switched. He has been managed on sulfasalazine 500mg BID " since that time. Attacks may have decreased slightly in frequency since starting treatment for Sjogren's.    Regarding other medical issues, he has been diagnosed with hypogonadism (initial T 400, fTe 7.6, LH 4.7) and is on testosterone depo-injections for the past 6 years, with an interim where he was on hCG while trying to have another kid. He has 2 children: a 4yo boy and 1yo girl. He lives with them and his fiance.     Patient has been seen by Dr. Gregorio James for tingling of the hands and feet which is persistent but minimal at baseline and is exacerbated during the attacks. EMG/NCS and autonomic testing are unremarkable.    Patient endorses fatigable weakness in the legs predominantly with walking up stairs. No burning sensation or muscle soreness. It does not back up other people behind him. He exercises off-and-on with stationary bike riding and weight lifting.    I have personally reviewed, read, and interpreted all of the patient's pertinent laboratory, imaging, and diagnostic studies. The patient self-ordered an MRI Brain about 2 years ago and paid out-of-pocket at an outside facility. It was found to be normal. normal.      ROS:  A comprehensive review-of-systems was obtained and negative unless noted above in the HPI.    Past Medical History  Past Medical History:   Diagnosis Date    Allergy status to unspecified drugs, medicaments and biological substances     History of seasonal allergies    Personal history of other specified conditions 11/09/2022    History of fatigue     Surgical History  Past Surgical History:   Procedure Laterality Date    OTHER SURGICAL HISTORY  07/02/2020    Jaw surgery    OTHER SURGICAL HISTORY  07/02/2020    Hand surgery    OTHER SURGICAL HISTORY  07/02/2020    Hernia repair     Social History  Social History     Tobacco Use    Smoking status: Never    Smokeless tobacco: Never   Substance Use Topics    Alcohol use: Yes    Drug use: Never     Allergies  Amoxicillin-pot  clavulanate and Potassium clavulanate    =========  Medications    Current Outpatient Medications:     amphetamine-dextroamphetamine XR (Adderall XR) 20 mg 24 hr capsule, Take 1 capsule (20 mg) by mouth once daily. For ADHD, Disp: , Rfl:     azelastine (Astelin) 137 mcg (0.1 %) nasal spray, Administer 1 spray into each nostril 2 times a day., Disp: 30 mL, Rfl: 6    cholecalciferol (Vitamin D-3) 5,000 Units tablet, Take 1 tablet (5,000 Units) by mouth 1 (one) time per week., Disp: , Rfl:     fluticasone (Flonase) 50 mcg/actuation nasal spray, Administer 1 spray into each nostril 2 times a day., Disp: 16 g, Rfl: 6    omega 3-dha-epa-fish oil (Fish OiL) 1,000 mg (120 mg-180 mg) capsule, Take by mouth., Disp: , Rfl:     sulfaSALAzine (Azulfidine) 500 mg tablet, Take 1 tablet (500 mg) by mouth 2 times a day., Disp: 180 tablet, Rfl: 1    tadalafil (Cialis) 20 mg tablet, Take 1 tablet (20 mg) by mouth if needed for erectile dysfunction (Start with half tab. Take 2 hours prior to wanting erection.If you get an erection over 4 hours, go to the emergency room.)., Disp: 30 tablet, Rfl: 6    testosterone cypionate (Depo-Testosterone) 200 mg/mL injection, Inject 0.5 mL (100 mg) into the muscle 1 (one) time per week., Disp: 5 mL, Rfl: 2     =========    Objective   Vital Signs  /66   Pulse 64   Temp 36.2 °C (97.1 °F)   Resp 18   Ht 1.829 m (6')   Wt 80.7 kg (178 lb)   BMI 24.14 kg/m²     Physical Exam  GENERAL: sitting up in chair comfortably; well-appearing and in NAD. Appears stated age.  PSYCHIATRIC/MSE: conversational; full euthymic affect; logical thought process; cognition intact.  NEUROLOGICAL:   Cognitive Status Exam:         Orientation: alert and oriented to person, place, time, and situation         Identification: can correctly identify common objects         Language: expression, naming (both phonemic and semantic), repetition, and comprehension intact.         Calculation: can perform basic arithmetic          Attention: can subtract serial sevens         Construction: can correctly draw an analog clock at 11:50         Abstraction: can explain the meaning of common proverbs         Information/Knowledge: can correctly state current & past events         Memory: can recall 3 words after five minutes         Concentration: can remain focused during interview         Complex Commands: follows correctly across midline; able to perform 3-step Luria task    Cranial Nerves:  I: patient reports he has a poor sense of smell at baseline.  II: Pupils- PERRL (4->2) briskly bilaterally. There is no APD.      VF- full to monocular confrontational testing  III, IV, VI: eyes aligned in primary position w/o fixation instability; EOM full-range with smooth pursuits and no gaze-evoked nystagmus; saccades accurate, conjugate, and rapid; intact vergence  V: intact to LT; strong mandibular opening  VII: intact facial strength and symmetry; nasolabial folds symmetric; no facial droop  VIII: intact hearing to conversation  IX and X: mild nasal dysphonia, no dysarthria; symmetric palatal rise  XI: SCM and trapezius have 5/5 strength  XII: midline tongue position at rest and intact movement, bulk, and strength    Motor:   Bulk: Normal    Tone: Normal  Tremor: Absent  Adventitious Movements: Absent  Pronator Drift: Absent     Strength:    R L  Deltoid  5 5  Biceps  5 5  Triceps  5 5  FCR/FCU 5 5  ECRL/ECU 5 5  FDS/FDM 5 5  EDC/EIP  5 5    5 5  FDI  5 5  ADM  5 5  APB  5 5  Iliopsoas  5 5  Glut Max 5 5  Quadriceps 5 5  Hamstrings 5 5  TA  5 5  GSC  5 5  EHL  5 5    Reflexes:    R L  Biceps  2+ 2+  Triceps  2+ 2+  Brachioradialis 2+ 2+  Patellar  3+ 3+ with crossed adductors b/l.  Achilles 2+ 2+                                 Toes: flexor plantar bilaterally  Sims's: absent  Inducible Ankle Clonus: absent    Sensory:   intact and symmetric to temperature and vibration on all extremities.    Coordination:   Finger-to-Nose: no ataxia; no  intention or action tremor; normokinetic; no dysmetria or overshoot  Heel-to-Shin: no ataxia; no intention or action tremor; normokinetic    Rapid Movements:  Finger Tapping: rapid; no dysdiadochokinesia  Foot Tapping: rapid    Gait/Stance:   Straightaway gait is stable with normal step width (slightly less than pelvic girth) and normal stride length (approx. 40% of height). The walk is steady w/o scissoring, shuffling, foot drop, steppage, circumduction, or ataxic movements.  Gait on tandem, toe, and heel are steady.    Add'l Maneuvers:   Romberg and Sharpened Romberg tests are negative. Patient is able to perform squats with examiner.      Recent/Relevant Labs:  Hemoglobin   Date/Time Value Ref Range Status   10/29/2024 07:31 AM 15.6 13.5 - 17.5 g/dL Final     WBC   Date/Time Value Ref Range Status   10/29/2024 07:31 AM 6.1 4.4 - 11.3 x10*3/uL Final     Sodium   Date/Time Value Ref Range Status   10/29/2024 07:31  136 - 145 mmol/L Final     Potassium   Date/Time Value Ref Range Status   10/29/2024 07:31 AM 4.1 3.5 - 5.3 mmol/L Final     Chloride   Date/Time Value Ref Range Status   10/29/2024 07:31  98 - 107 mmol/L Final     Urea Nitrogen   Date/Time Value Ref Range Status   10/29/2024 07:31 AM 19 6 - 23 mg/dL Final     Creatinine   Date/Time Value Ref Range Status   10/29/2024 07:31 AM 0.88 0.50 - 1.30 mg/dL Final     Calcium   Date/Time Value Ref Range Status   10/29/2024 07:31 AM 9.4 8.6 - 10.3 mg/dL Final     Total Protein   Date/Time Value Ref Range Status   10/29/2024 07:31 AM 7.3 6.4 - 8.2 g/dL Final     Albumin   Date/Time Value Ref Range Status   10/29/2024 07:31 AM 4.4 3.4 - 5.0 g/dL Final     Bilirubin, Total   Date/Time Value Ref Range Status   10/29/2024 07:31 AM 0.7 0.0 - 1.2 mg/dL Final     Bilirubin, Direct   Date/Time Value Ref Range Status   08/26/2024 12:27 PM 0.1 0.0 - 0.3 mg/dL Final     AST   Date/Time Value Ref Range Status   10/29/2024 07:31 AM 14 9 - 39 U/L Final     ALT    Date/Time Value Ref Range Status   10/29/2024 07:31 AM 12 10 - 52 U/L Final     Comment:     Patients treated with Sulfasalazine may generate falsely decreased results for ALT.     Alkaline Phosphatase   Date/Time Value Ref Range Status   10/29/2024 07:31 AM 55 33 - 120 U/L Final     Total CK   Date/Time Value Ref Range Status   05/17/2022 11:08 AM 76 0 - 325 U/L Final      LDL   Date/Time Value Ref Range Status   08/31/2023 07:32  (H) 0 - 99 mg/dL Final     Comment:     .                           NEAR      BORD      AGE      DESIRABLE  OPTIMAL    HIGH     HIGH     VERY HIGH     0-19 Y     0 - 109     ---    110-129   >/= 130     ----    20-24 Y     0 - 119     ---    120-159   >/= 160     ----      >24 Y     0 -  99   100-129  130-159   160-189     >/=190  .   07/07/2022 07:01 AM 79 0 - 99 mg/dL Final     Comment:     .                           NEAR      BORD      AGE      DESIRABLE  OPTIMAL    HIGH     HIGH     VERY HIGH     0-19 Y     0 - 109     ---    110-129   >/= 130     ----    20-24 Y     0 - 119     ---    120-159   >/= 160     ----      >24 Y     0 -  99   100-129  130-159   160-189     >/=190  .     09/21/2021 09:41 AM 95 0 - 99 mg/dL Final     Comment:     .                           NEAR      BORD      AGE      DESIRABLE  OPTIMAL    HIGH     HIGH     VERY HIGH     0-19 Y     0 - 109     ---    110-129   >/= 130     ----    20-24 Y     0 - 119     ---    120-159   >/= 160     ----      >24 Y     0 -  99   100-129  130-159   160-189     >/=190  .       Triglycerides   Date/Time Value Ref Range Status   11/13/2024 07:24 AM 67 0 - 149 mg/dL Final     Comment:     Age              Desirable        Borderline         High        Very High  SEX:B           mg/dL             mg/dL               mg/dL      mg/dL  <=14D                       ----               ----        ----  15D-365D                    ----               ----        ----  1Y-9Y           0-74               75-99              >=100       ----  10Y-19Y        0-89                            >=130       ----  20Y-24Y        0-114             115-149             >=150      ----  >= 25Y         0-149             150-199             200-499    >=500      Venipuncture immediately after or during the administration of Metamizole may lead to falsely low results. Testing should be performed immediately prior to Metamizole dosing.     Hemoglobin A1C   Date/Time Value Ref Range Status   10/29/2024 07:31 AM 4.9 See comment % Final   08/31/2023 07:32 AM 5.3 % Final     Comment:          Diagnosis of Diabetes-Adults   Non-Diabetic: < or = 5.6%   Increased risk for developing diabetes: 5.7-6.4%   Diagnostic of diabetes: > or = 6.5%  .       Monitoring of Diabetes                Age (y)     Therapeutic Goal (%)   Adults:          >18           <7.0   Pediatrics:    13-18           <7.5                   7-12           <8.0                   0- 6            7.5-8.5   American Diabetes Association. Diabetes Care 33(S1), Jan 2010.     Estimated Average Glucose   Date/Time Value Ref Range Status   10/29/2024 07:31 AM 94 Not Established mg/dL Final     TSH   Date/Time Value Ref Range Status   08/31/2023 07:32 AM 1.98 0.44 - 3.98 mIU/L Final     Comment:      TSH testing is performed using different testing    methodology at [facility] than at other    [facility]. Direct result comparisons should    only be made within the same method.   07/07/2022 07:01 AM 2.74 0.44 - 3.98 mIU/L Final     Comment:      TSH testing is performed using different testing    methodology at [facility] than at other    [facility]. Direct result comparisons should    only be made within the same method.     09/21/2021 09:41 AM 2.09 0.44 - 3.98 mIU/L Final     Comment:      TSH testing is performed using different testing    methodology at [facility] than at other    [facility]. Direct result comparisons should     "only be made within the same method.       Thyroid Stimulating Hormone   Date/Time Value Ref Range Status   10/29/2024 07:31 AM 1.88 0.44 - 3.98 mIU/L Final     Vitamin B-12   Date/Time Value Ref Range Status   08/31/2023 07:32  211 - 911 pg/mL Final   11/04/2020 11:05  211 - 911 pg/mL Final     Vitamin D, 25-Hydroxy   Date/Time Value Ref Range Status   08/31/2023 07:32 AM 56 ng/mL Final     Comment:     .  DEFICIENCY:         < 20   NG/ML  INSUFFICIENCY:      20-29  NG/ML  SUFFICIENCY:         NG/ML    THIS ASSAY ACCURATELY QUANTIFIES THE SUM OF  VITAMIN D3, 25-HYDROXY AND VIT D2,25-HYDROXY.       Recent/Relevant Imaging:  No MRI head results found for the past 12 months   No CT head results found for the past 12 months     Other Recent/Relevant Studies:  No EMG results found for the past 12 months   No EEG results found for the past 12 months   No echocardiogram results found for the past 12 months  No results found for this or any previous visit (from the past 4464 hours).     =========  Assessment/Plan   Assessment:  Andrew Grant is a 39 y.o. right-handed male with a history notable for istory notable for ADHD, hypogonadism, and Sjogren's Syndrome (on sulfasalazine) who presents to the neurology clinic for evaluation of attacks of brain fog. He is referred by Dr. Gregorio James to evaluate for MS or NMOSD. The patient complains of declining health for over a decade which culminated in the discovery of male hypogonadism and Sjogren's Syndrome, for which the patient is receiving adequate treatment. Around the same time he developed \"attacks\" of cognitive slowing, bilateral conjunctival injection, and worsening sicca symptomatology. Other associated manifestations include a right-sided red ear phenomenon, worsening of baseline tingling sensation in the hands and feet, and sometimes with non-debilitating lower extremity weakness making it a little harder to ascend stairs. There are no migrainous or " other autonomic manifestations. He has never had attacks typical for a demyelinating syndrome (optic neuropathy, myelopathy, diencephalopathy, etc.). A thorough neurological examination is benign. Brain imaging with MRI obtained several years ago, well-into the onset of these attacks, was unremarkable. The clinical presentation remains peculiar but with respect to the consult question, is not consistent with a demyelinating disorder. While neuromyelitis optica is statistically associated with Sjogren's, the patient has no stigmata of these attacks. Indeed, his own relapsing-remitting attacks superficially sound cortical in nature, which NMOSD specifically spares. MS is always a consideration just based on its incidence and prevalence in the general population (no significant association with Sjogren's), but cognitive findings are a late feature after significant plaque burden has occurred. The current presentation is thus inconsistent with a central demyelinating disorder, though this is certainly not to say the patient couldn't develop one in the future. Indeed, if the patient were to develop vision loss or paresis, the probability of NMOSD would be high and would warrant emergent evaluation by a neurologist, but the current presentation is very inconsistent with this diagnosis.    Impression: Low Concern for Central Demyelinating Disorder (At This Time)    Plan:  - no further neuro-immunological workup is indicated at this time.  - follow-up with community neurologist.      The patient was seen and discussed with the attending neuroimmunologist Dr. Alejandro Romo, who agrees with the above. A copy of this note will be sent to the referring physician.    Jaylen Figueroa MD  Neurology Chief Resident PGY-4  [facility] [facility] [employer]

## 2025-04-22 PROCEDURE — 88305 TISSUE EXAM BY PATHOLOGIST: CPT | Performed by: DERMATOLOGY

## 2025-04-23 ENCOUNTER — LAB REQUISITION (OUTPATIENT)
Dept: DERMATOPATHOLOGY | Facility: CLINIC | Age: 40
End: 2025-04-23
Payer: COMMERCIAL

## 2025-04-23 DIAGNOSIS — D48.5 NEOPLASM OF UNCERTAIN BEHAVIOR OF SKIN: ICD-10-CM

## 2025-04-24 LAB
ESTRADIOL SERPL-MCNC: 30 PG/ML
HCT VFR BLD AUTO: 46.2 % (ref 38.5–50)
LABORATORY COMMENT REPORT: NORMAL
LH SERPL-ACNC: 0.3 MIU/ML (ref 1.5–9.3)
PATH REPORT.FINAL DX SPEC: NORMAL
PATH REPORT.GROSS SPEC: NORMAL
PATH REPORT.MICROSCOPIC SPEC OTHER STN: NORMAL
PATH REPORT.RELEVANT HX SPEC: NORMAL
PATH REPORT.TOTAL CANCER: NORMAL
PSA SERPL-MCNC: 0.63 NG/ML
TESTOST SERPL-MCNC: 966 NG/DL (ref 250–827)

## 2025-04-24 NOTE — PROGRESS NOTES
"Virtual or Telephone Consent    An interactive audio and video telecommunication system which permits real time communications between the patient (at the originating site) and provider (at the distant site) was utilized to provide this telehealth service.   Verbal consent was requested and obtained from Andrew Grant on this date, 04/25/25 for a telehealth visit and the patient's location was confirmed at the time of the visit.    Last visit 2/26/25  #Erectile Dysfunction  -Continue cialis 10mg prn  #Elevated estrogen  -hold anastrozole 0.5 mg tablet weekly  #Hypogonadism  -Continue testosterone 0.5 mL injectionweekly  -T labs pending  -Will do low T labs today, if wnl continue same regimen  #S/p Vasectomy   -doing well    Fu in 6 months with NP with low T labs prior if labs ok today    Today's visit:  Erectile Dysfunction  -Taking cialis 10mg prn  -Happy with dose, no side effects  -No prolonged erections     #Hypogonadism  -.5cc weekly--> going well   -E good  -no side effects  -sex drive, libido good  -Denies any breast enlargement or tenderness  -T lab 1-2 days after dose      #Elevated estrogen  -stopped anastrozole  -E wnl now      #Post-Vasectomy  -Doing well  -SA on 5/31/23 was azoospermic       Labs    Lab Results   Component Value Date    TESTOSTERONE 966 (H) 04/23/2025    TESTOSTERONE 872 11/13/2024    TESTOSTERONE 1,006 (H) 08/26/2024     Lab Results   Component Value Date    LH 0.3 (L) 04/23/2025     Lab Results   Component Value Date    FSH <0.9 02/19/2024     Lab Results   Component Value Date    ESTRADIOL 30 04/23/2025       Lab Results   Component Value Date    PSA 0.63 04/23/2025     No components found for: \"CBC\"  Lab Results   Component Value Date    PROLACTIN 9.7 02/19/2024     Lab Results   Component Value Date    HGBA1C 4.9 10/29/2024     Hematocrit: 46.2      Medications:    Current Outpatient Medications:     amphetamine-dextroamphetamine XR (Adderall XR) 20 mg 24 hr capsule, Take 1 capsule " (20 mg) by mouth once daily. For ADHD, Disp: , Rfl:     azelastine (Astelin) 137 mcg (0.1 %) nasal spray, Administer 1 spray into each nostril 2 times a day., Disp: 30 mL, Rfl: 6    cholecalciferol (Vitamin D-3) 5,000 Units tablet, Take 1 tablet (5,000 Units) by mouth 1 (one) time per week., Disp: , Rfl:     fluticasone (Flonase) 50 mcg/actuation nasal spray, Administer 1 spray into each nostril 2 times a day., Disp: 16 g, Rfl: 6    omega 3-dha-epa-fish oil (Fish OiL) 1,000 mg (120 mg-180 mg) capsule, Take by mouth., Disp: , Rfl:     sulfaSALAzine (Azulfidine) 500 mg tablet, Take 1 tablet (500 mg) by mouth 2 times a day., Disp: 180 tablet, Rfl: 1    tadalafil (Cialis) 20 mg tablet, Take 1 tablet (20 mg) by mouth if needed for erectile dysfunction (Start with half tab. Take 2 hours prior to wanting erection.If you get an erection over 4 hours, go to the emergency room.)., Disp: 30 tablet, Rfl: 6    testosterone cypionate (Depo-Testosterone) 200 mg/mL injection, Inject 0.5 mL (100 mg) into the muscle 1 (one) time per week., Disp: 5 mL, Rfl: 2    Allergy:  Allergies   Allergen Reactions    Amoxicillin-Pot Clavulanate Hives    Potassium Clavulanate Unknown        Exam  CONSTITUTIONAL:        No acute distress    HEAD:        Normocephalic and atraumatic    CHEST / RESPIRATORY      no excess work of breathing, no respiratory distress,    ABDOMEN / GASTROINTESTINAL:        Abdomen nondistended        Assessment/Plan  #Erectile Dysfunction  -Continue cialis 10mg prn     #Elevated estrogen  -E wnl now     #Hypogonadism  -Continue testosterone 0.5 mL injections weekly, discussed increasing dose, would like to maintain current dose  -T labs reviewed     #prostate ca screening  -psa    #S/p Vasectomy   -doing well    Fu in 6 months with NP with low T fu labs     I have personally reviewed the O[facility] report for this patient I have considered the risks of abuse, dependence, addiction and diversion. I believe that it is clinically  appropriate for him to be prescribed this medication.”  G [id number]  Visit complexity inherent to evaluation and management (E&M) associated with medical care services that serve as the continuing focal point for all needed health care services and/or with medical care services that are part of ongoing care related to a patient's single, serious condition or a complex condition.    Fu in 6 months with low T labs with Carrie

## 2025-04-25 ENCOUNTER — TELEMEDICINE (OUTPATIENT)
Dept: UROLOGY | Facility: CLINIC | Age: 40
End: 2025-04-25
Payer: COMMERCIAL

## 2025-04-25 DIAGNOSIS — R79.89 LOW TESTOSTERONE: Primary | ICD-10-CM

## 2025-04-25 DIAGNOSIS — Z12.5 PROSTATE CANCER SCREENING: ICD-10-CM

## 2025-04-25 DIAGNOSIS — E29.1 HYPOGONADISM IN MALE: ICD-10-CM

## 2025-04-25 DIAGNOSIS — N52.9 ERECTILE DYSFUNCTION, UNSPECIFIED ERECTILE DYSFUNCTION TYPE: ICD-10-CM

## 2025-04-25 PROCEDURE — 99214 OFFICE O/P EST MOD 30 MIN: CPT | Performed by: UROLOGY

## 2025-04-25 PROCEDURE — G2211 COMPLEX E/M VISIT ADD ON: HCPCS | Performed by: UROLOGY

## 2025-04-25 RX ORDER — TESTOSTERONE CYPIONATE 200 MG/ML
100 INJECTION, SOLUTION INTRAMUSCULAR
Qty: 5 ML | Refills: 5 | Status: SHIPPED | OUTPATIENT
Start: 2025-04-27

## 2025-04-25 RX ORDER — TADALAFIL 20 MG/1
20 TABLET ORAL AS NEEDED
Qty: 30 TABLET | Refills: 6 | Status: SHIPPED | OUTPATIENT
Start: 2025-04-25 | End: 2026-04-25

## 2025-07-17 DIAGNOSIS — J30.89 ALLERGIC RHINITIS DUE TO OTHER ALLERGIC TRIGGER, UNSPECIFIED SEASONALITY: Primary | ICD-10-CM

## 2025-08-26 ENCOUNTER — APPOINTMENT (OUTPATIENT)
Dept: UROLOGY | Facility: CLINIC | Age: 40
End: 2025-08-26
Payer: COMMERCIAL

## 2025-10-28 ENCOUNTER — APPOINTMENT (OUTPATIENT)
Dept: UROLOGY | Facility: CLINIC | Age: 40
End: 2025-10-28
Payer: COMMERCIAL